# Patient Record
Sex: FEMALE | Race: OTHER | NOT HISPANIC OR LATINO
[De-identification: names, ages, dates, MRNs, and addresses within clinical notes are randomized per-mention and may not be internally consistent; named-entity substitution may affect disease eponyms.]

---

## 2019-09-03 ENCOUNTER — RECORD ABSTRACTING (OUTPATIENT)
Age: 54
End: 2019-09-03

## 2019-09-04 ENCOUNTER — APPOINTMENT (OUTPATIENT)
Dept: ELECTROPHYSIOLOGY | Facility: CLINIC | Age: 54
End: 2019-09-04
Payer: COMMERCIAL

## 2019-09-04 VITALS
DIASTOLIC BLOOD PRESSURE: 78 MMHG | OXYGEN SATURATION: 96 % | HEART RATE: 83 BPM | HEIGHT: 65 IN | BODY MASS INDEX: 27.99 KG/M2 | WEIGHT: 168 LBS | SYSTOLIC BLOOD PRESSURE: 138 MMHG

## 2019-09-04 DIAGNOSIS — Z86.79 PERSONAL HISTORY OF OTHER DISEASES OF THE CIRCULATORY SYSTEM: ICD-10-CM

## 2019-09-04 DIAGNOSIS — Z87.09 PERSONAL HISTORY OF OTHER DISEASES OF THE RESPIRATORY SYSTEM: ICD-10-CM

## 2019-09-04 DIAGNOSIS — Z85.3 PERSONAL HISTORY OF MALIGNANT NEOPLASM OF BREAST: ICD-10-CM

## 2019-09-04 DIAGNOSIS — I47.1 SUPRAVENTRICULAR TACHYCARDIA: ICD-10-CM

## 2019-09-04 DIAGNOSIS — Z87.891 PERSONAL HISTORY OF NICOTINE DEPENDENCE: ICD-10-CM

## 2019-09-04 PROCEDURE — 93000 ELECTROCARDIOGRAM COMPLETE: CPT

## 2019-09-04 PROCEDURE — 99244 OFF/OP CNSLTJ NEW/EST MOD 40: CPT

## 2019-09-04 RX ORDER — HYDROCHLOROTHIAZIDE 25 MG/1
25 TABLET ORAL DAILY
Qty: 90 | Refills: 3 | Status: ACTIVE | COMMUNITY

## 2019-09-04 NOTE — DISCUSSION/SUMMARY
[FreeTextEntry1] : 53 year old woman with history of asthma, breast cancer s/p reconstruction, HTN, obesity s/p bariatric surgery, presenting for evaluation of palpitation and suspected SVT. She has a very long history of palpitation, with both mild “fluttering” and more recent sustained tachycardia which required adenosine x 3 for termination after calling EMS. I do suspect she has an underlying SVT and possible accessory pathway mediated tachycardia (based on long history and different symptoms) but there is no clear preexcitation on ECG. We discussed management options in detail, including medical therapy and catheter ablation. The risks and benefits of ablation, including procedure related risks such as bleeding, cardiac perforation, tamponade, stroke, and heart block requiring pacemaker implant were discussed. She expressed understanding, and does ultimately want to proceed with SVT ablation when she is ready. \par -will get 1 week event monitor to evaluate for symptom correlation. \par -EP study and SVT ablation. Hold Toprol for 5 days prior to the procedure\par -will consider repeat monitoring after ablation pending her symptoms given her different types of palpitation to evaluate for other arrhythmias eg AF or ectopy.\par

## 2019-09-04 NOTE — REASON FOR VISIT
[Consultation] : a consultation regarding [Palpitations] : palpitations [Supraventricular Tachycardia] : supraventricular tachycardia [FreeTextEntry1] : ref Dr Thompson

## 2019-09-04 NOTE — REVIEW OF SYSTEMS
[Shortness Of Breath] : no shortness of breath [Dyspnea on exertion] : not dyspnea during exertion [Chest Pain] : no chest pain [Lower Ext Edema] : no extremity edema [Dizziness] : no dizziness [Palpitations] : palpitations [Convulsions] : no convulsions [Confusion] : no confusion was observed [Easy Bleeding] : no tendency for easy bleeding [Easy Bruising] : no tendency for easy bruising [Negative] : Genitourinary

## 2019-09-04 NOTE — HISTORY OF PRESENT ILLNESS
[FreeTextEntry1] : 53 year old woman with history of asthma, breast cancer s/p reconstruction, HTN, obesity s/p bariatric surgery, presenting for evaluation of palpitation and suspected SVT. \par \par She has had palpitation since she was in high school, describing episodes of chest fluttering lasting minutes occurring suddenly, and often resolving with cough or bearing down. Workup with stress test and echo were normal and she has subsequently undergone successful bariatric surgery. A Holter monitor in 2015 revealed sinus rhythm with rates  bpm, and one run of brief irregular AT for 4 beats at 131 bpm, but no symptoms occurred during monitoring. She had an episode of more sustained palpitation with associated presyncope in 7/19 while at work and was noted to have SVT >185 bpm which terminated after adenosine administration (3 doses) during ambulance transfer to a [facility]. She was subsequently in normal rhythm and felt better. She has noted that this episode was different that her usual “Fluttering”.\par She is currently on Toprol 100mg qd which was increased in July 2019. She has not noted any significant improvement in her palpitation with this medication. She denies syncope. She is now referred for consideration of SVT ablation. \par ECG now reveals sinus rhythm with normal intervals and no manifest preexcitaiton. \par

## 2019-09-04 NOTE — PHYSICAL EXAM
[General Appearance - Well Developed] : well developed [Well Groomed] : well groomed [General Appearance - In No Acute Distress] : no acute distress [Normal Conjunctiva] : the conjunctiva exhibited no abnormalities [General Appearance - Well Nourished] : well nourished [Normal Jugular Venous V Waves Present] : normal jugular venous V waves present [Normal Oral Mucosa] : normal oral mucosa [Heart Rate And Rhythm] : heart rate and rhythm were normal [Heart Sounds] : normal S1 and S2 [Murmurs] : no murmurs present [Edema] : no peripheral edema present [Respiration, Rhythm And Depth] : normal respiratory rhythm and effort [Auscultation Breath Sounds / Voice Sounds] : lungs were clear to auscultation bilaterally [Abdomen Tenderness] : non-tender [Abdomen Soft] : soft [Abnormal Walk] : normal gait [Cyanosis, Localized] : no localized cyanosis [Nail Clubbing] : no clubbing of the fingernails [Oriented To Time, Place, And Person] : oriented to person, place, and time [] : no rash [Impaired Insight] : insight and judgment were intact [No Anxiety] : not feeling anxious

## 2020-08-13 ENCOUNTER — TRANSCRIPTION ENCOUNTER (OUTPATIENT)
Age: 55
End: 2020-08-13

## 2021-01-31 ENCOUNTER — TRANSCRIPTION ENCOUNTER (OUTPATIENT)
Age: 56
End: 2021-01-31

## 2021-02-26 ENCOUNTER — TRANSCRIPTION ENCOUNTER (OUTPATIENT)
Age: 56
End: 2021-02-26

## 2021-03-29 ENCOUNTER — TRANSCRIPTION ENCOUNTER (OUTPATIENT)
Age: 56
End: 2021-03-29

## 2021-04-21 ENCOUNTER — TRANSCRIPTION ENCOUNTER (OUTPATIENT)
Age: 56
End: 2021-04-21

## 2021-10-26 DIAGNOSIS — Z86.79 PERSONAL HISTORY OF OTHER DISEASES OF THE CIRCULATORY SYSTEM: ICD-10-CM

## 2021-10-26 DIAGNOSIS — I48.91 UNSPECIFIED ATRIAL FIBRILLATION: ICD-10-CM

## 2021-10-27 ENCOUNTER — APPOINTMENT (OUTPATIENT)
Dept: ELECTROPHYSIOLOGY | Facility: CLINIC | Age: 56
End: 2021-10-27
Payer: COMMERCIAL

## 2021-10-27 VITALS
HEIGHT: 65 IN | DIASTOLIC BLOOD PRESSURE: 84 MMHG | SYSTOLIC BLOOD PRESSURE: 136 MMHG | HEART RATE: 69 BPM | WEIGHT: 176 LBS | BODY MASS INDEX: 29.32 KG/M2

## 2021-10-27 PROCEDURE — 99215 OFFICE O/P EST HI 40 MIN: CPT

## 2021-10-27 PROCEDURE — 93000 ELECTROCARDIOGRAM COMPLETE: CPT

## 2021-10-27 NOTE — PHYSICAL EXAM
[Well Developed] : well developed [Well Nourished] : well nourished [No Acute Distress] : no acute distress [Normal Conjunctiva] : normal conjunctiva [Normal Venous Pressure] : normal venous pressure [No Carotid Bruit] : no carotid bruit [Normal S1, S2] : normal S1, S2 [No Murmur] : no murmur [No Rub] : no rub [No Gallop] : no gallop [Clear Lung Fields] : clear lung fields [Good Air Entry] : good air entry [No Respiratory Distress] : no respiratory distress  [Soft] : abdomen soft [Normal Gait] : normal gait [No Edema] : no edema [No Cyanosis] : no cyanosis [No Clubbing] : no clubbing [No Varicosities] : no varicosities [No Rash] : no rash [No Skin Lesions] : no skin lesions [Moves all extremities] : moves all extremities [No Focal Deficits] : no focal deficits [Normal Speech] : normal speech [Alert and Oriented] : alert and oriented [Normal memory] : normal memory

## 2021-10-27 NOTE — REVIEW OF SYSTEMS
[SOB] : no shortness of breath [Chest Discomfort] : chest discomfort [Leg Claudication] : no intermittent leg claudication [Palpitations] : palpitations [Syncope] : no syncope [Negative] : Heme/Lymph

## 2021-10-27 NOTE — DISCUSSION/SUMMARY
[FreeTextEntry1] : 55 year old woman with history of asthma, breast cancer s/p reconstruction, HTN, obesity s/p bariatric surgery, presenting for follow-up of palpitation and suspected SVT and recently diagnosed atrial fibrillation. She has a long history of SVT which had previously been terminated with adenosine and vagal maneuvers. Recently she had severe palpitation and was found to have AF and atrial flutter requiring hospital admission. The arrhythmias have recurred despite beta blockade, and recently the dose of beta blocker was lowered potentially due to low BP.  \par \par We did discuss her arrhythmias in detail; I explained that SVT and AF are distinct and often unrelated arrhythmias, but that often SVT may be a trigger for AF, and treatment of SVT (such as with successful ablation) may prevent further AF in many cases. We did again discuss treatment options including medical therapy and ablation. The risks and benefits of SVT ablation were reviewed, including procedure-related risks such as bleeding, vascular injury, cardiac perforation, stroke and heart block requiring ppm implant. I explained that following SVT ablation would monitor her for recurrent AF (or other arrhythmia) and determine need for further treatment for this based on findings. She expressed understanding, and does want to proceed with SVT ablation.  \par \par -Ablation of SVT. She will plan to do this following a planned shoulder/rotator cuff surgery which is planned in December. Hold Toprol for 5 days prior.  \par \par -continue current meds including Toprol and Xarelto for now. Will plan surveillance monitoring for recurrent arrhythmia following SVT ablation, and reassess need for anticoagulation following this. We did discuss potential for ILR implant for close monitoring to clarify presence of AF, but she reports her PMD advised her not to have an ILR.

## 2021-10-27 NOTE — CARDIOLOGY SUMMARY
[de-identified] : 10/27/21 sinus rhythm 69 bpm, narrow QRS, nml intervals [de-identified] : ETT 9/17/21 negative for ischemia or induced arrhythmia  [___] : [unfilled]

## 2021-10-27 NOTE — HISTORY OF PRESENT ILLNESS
[FreeTextEntry1] : 55 year old woman with history of asthma, breast cancer s/p reconstruction, HTN, obesity s/p bariatric surgery, presenting for follow-up of palpitation and suspected SVT and recently diagnosed atrial fibrillation.  \par \par She has a long history of palpitation which began in high school, with sudden episodes of “fluttering” in chest that occur suddenly, and often resolve with cough or bearing down. In 7/2019 she had an episode of more sustained palpitation while at work and was found to have SVT at >185 bpm- EMS was called and she was found to be in SVT which terminated with adenosine administration. She had subsequently been on Toprol 100mg qd, but did not note significant improvement in her palpitation with this. At last visit we did discuss potential for EP study and ablation, but due to COVID this was postponed/cancelled.  \par \par An event monitor was performed 10/17- 10/24/21 which revealed sinus rhythm with average HR 76 bpm (range  bpm) with occasional ectopy but no arrhythmias.  \par \par Recently she has had increased frequent of palpitation, and in 9/2021 she presented to [facility] with severe palpitation and chest pain, and at that time was noted to be in atrial fibrillation with rapid rates. She was apparently given adenosine with no effect, and ultimately given IV cardizem and the arrhythmia terminated. Atrial flutter with 2:1 conduction was also documented in the hospital. She was given anticoagulation with Xarelto, and her Toprol was lowered to 25mg qd (apparently due to low BP).  \par \par An echo with her PMD revealed cardiomegaly but preserved LV function (no records of this).  An ETT after the hospitalized revealed no ischemic changes.  \par \par Since hospital discharge she had another episode of palpitation a few weeks ago, which ultimately terminated with vagal maneuvers.  \par \par She otherwise feels well apart from her palpitation/cheat pain. She does now want to proceed with SVT ablation.

## 2021-11-30 ENCOUNTER — TRANSCRIPTION ENCOUNTER (OUTPATIENT)
Age: 56
End: 2021-11-30

## 2021-12-10 ENCOUNTER — NON-APPOINTMENT (OUTPATIENT)
Age: 56
End: 2021-12-10

## 2022-01-11 ENCOUNTER — OUTPATIENT (OUTPATIENT)
Dept: OUTPATIENT SERVICES | Facility: HOSPITAL | Age: 57
LOS: 1 days | End: 2022-01-11
Payer: COMMERCIAL

## 2022-01-11 VITALS
OXYGEN SATURATION: 98 % | TEMPERATURE: 98 F | WEIGHT: 170.42 LBS | HEIGHT: 64 IN | HEART RATE: 20 BPM | DIASTOLIC BLOOD PRESSURE: 60 MMHG | SYSTOLIC BLOOD PRESSURE: 100 MMHG | RESPIRATION RATE: 20 BRPM

## 2022-01-11 DIAGNOSIS — Z90.3 ACQUIRED ABSENCE OF STOMACH [PART OF]: Chronic | ICD-10-CM

## 2022-01-11 DIAGNOSIS — Z98.890 OTHER SPECIFIED POSTPROCEDURAL STATES: Chronic | ICD-10-CM

## 2022-01-11 DIAGNOSIS — I48.91 UNSPECIFIED ATRIAL FIBRILLATION: ICD-10-CM

## 2022-01-11 DIAGNOSIS — Z90.710 ACQUIRED ABSENCE OF BOTH CERVIX AND UTERUS: Chronic | ICD-10-CM

## 2022-01-11 DIAGNOSIS — G47.33 OBSTRUCTIVE SLEEP APNEA (ADULT) (PEDIATRIC): ICD-10-CM

## 2022-01-11 DIAGNOSIS — Z98.89 OTHER SPECIFIED POSTPROCEDURAL STATES: Chronic | ICD-10-CM

## 2022-01-11 DIAGNOSIS — Z01.818 ENCOUNTER FOR OTHER PREPROCEDURAL EXAMINATION: ICD-10-CM

## 2022-01-11 DIAGNOSIS — Z29.9 ENCOUNTER FOR PROPHYLACTIC MEASURES, UNSPECIFIED: ICD-10-CM

## 2022-01-11 DIAGNOSIS — Z98.51 TUBAL LIGATION STATUS: Chronic | ICD-10-CM

## 2022-01-11 DIAGNOSIS — Z98.84 BARIATRIC SURGERY STATUS: Chronic | ICD-10-CM

## 2022-01-11 DIAGNOSIS — Z90.49 ACQUIRED ABSENCE OF OTHER SPECIFIED PARTS OF DIGESTIVE TRACT: Chronic | ICD-10-CM

## 2022-01-11 LAB
ANION GAP SERPL CALC-SCNC: 10 MMOL/L — SIGNIFICANT CHANGE UP (ref 5–17)
ANISOCYTOSIS BLD QL: SLIGHT — SIGNIFICANT CHANGE UP
APTT BLD: 53.8 SEC — HIGH (ref 27.5–35.5)
BASOPHILS # BLD AUTO: 0.15 K/UL — SIGNIFICANT CHANGE UP (ref 0–0.2)
BASOPHILS NFR BLD AUTO: 4.4 % — HIGH (ref 0–2)
BLD GP AB SCN SERPL QL: SIGNIFICANT CHANGE UP
BUN SERPL-MCNC: 14.4 MG/DL — SIGNIFICANT CHANGE UP (ref 8–20)
CALCIUM SERPL-MCNC: 9.6 MG/DL — SIGNIFICANT CHANGE UP (ref 8.6–10.2)
CHLORIDE SERPL-SCNC: 101 MMOL/L — SIGNIFICANT CHANGE UP (ref 98–107)
CO2 SERPL-SCNC: 29 MMOL/L — SIGNIFICANT CHANGE UP (ref 22–29)
CREAT SERPL-MCNC: 0.96 MG/DL — SIGNIFICANT CHANGE UP (ref 0.5–1.3)
EOSINOPHIL # BLD AUTO: 0.06 K/UL — SIGNIFICANT CHANGE UP (ref 0–0.5)
EOSINOPHIL NFR BLD AUTO: 1.7 % — SIGNIFICANT CHANGE UP (ref 0–6)
GIANT PLATELETS BLD QL SMEAR: PRESENT — SIGNIFICANT CHANGE UP
GLUCOSE SERPL-MCNC: 89 MG/DL — SIGNIFICANT CHANGE UP (ref 70–99)
HCT VFR BLD CALC: 38.8 % — SIGNIFICANT CHANGE UP (ref 34.5–45)
HGB BLD-MCNC: 11.9 G/DL — SIGNIFICANT CHANGE UP (ref 11.5–15.5)
INR BLD: 2.48 RATIO — HIGH (ref 0.88–1.16)
LYMPHOCYTES # BLD AUTO: 1.5 K/UL — SIGNIFICANT CHANGE UP (ref 1–3.3)
LYMPHOCYTES # BLD AUTO: 43.9 % — SIGNIFICANT CHANGE UP (ref 13–44)
MAGNESIUM SERPL-MCNC: 1.9 MG/DL — SIGNIFICANT CHANGE UP (ref 1.8–2.6)
MANUAL SMEAR VERIFICATION: SIGNIFICANT CHANGE UP
MCHC RBC-ENTMCNC: 26.4 PG — LOW (ref 27–34)
MCHC RBC-ENTMCNC: 30.7 GM/DL — LOW (ref 32–36)
MCV RBC AUTO: 86.2 FL — SIGNIFICANT CHANGE UP (ref 80–100)
MICROCYTES BLD QL: SLIGHT — SIGNIFICANT CHANGE UP
MONOCYTES # BLD AUTO: 0.18 K/UL — SIGNIFICANT CHANGE UP (ref 0–0.9)
MONOCYTES NFR BLD AUTO: 5.3 % — SIGNIFICANT CHANGE UP (ref 2–14)
NEUTROPHILS # BLD AUTO: 1.44 K/UL — LOW (ref 1.8–7.4)
NEUTROPHILS NFR BLD AUTO: 42.1 % — LOW (ref 43–77)
OVALOCYTES BLD QL SMEAR: SLIGHT — SIGNIFICANT CHANGE UP
PLAT MORPH BLD: NORMAL — SIGNIFICANT CHANGE UP
PLATELET # BLD AUTO: 293 K/UL — SIGNIFICANT CHANGE UP (ref 150–400)
POIKILOCYTOSIS BLD QL AUTO: SLIGHT — SIGNIFICANT CHANGE UP
POLYCHROMASIA BLD QL SMEAR: SLIGHT — SIGNIFICANT CHANGE UP
POTASSIUM SERPL-MCNC: 3.8 MMOL/L — SIGNIFICANT CHANGE UP (ref 3.5–5.3)
POTASSIUM SERPL-SCNC: 3.8 MMOL/L — SIGNIFICANT CHANGE UP (ref 3.5–5.3)
PROTHROM AB SERPL-ACNC: 27.6 SEC — HIGH (ref 10.6–13.6)
RBC # BLD: 4.5 M/UL — SIGNIFICANT CHANGE UP (ref 3.8–5.2)
RBC # FLD: 15.6 % — HIGH (ref 10.3–14.5)
RBC BLD AUTO: ABNORMAL
SODIUM SERPL-SCNC: 140 MMOL/L — SIGNIFICANT CHANGE UP (ref 135–145)
VARIANT LYMPHS # BLD: 2.6 % — SIGNIFICANT CHANGE UP (ref 0–6)
WBC # BLD: 3.42 K/UL — LOW (ref 3.8–10.5)
WBC # FLD AUTO: 3.42 K/UL — LOW (ref 3.8–10.5)

## 2022-01-11 PROCEDURE — 93010 ELECTROCARDIOGRAM REPORT: CPT

## 2022-01-11 PROCEDURE — 93005 ELECTROCARDIOGRAM TRACING: CPT

## 2022-01-11 PROCEDURE — G0463: CPT

## 2022-01-11 NOTE — ASU PATIENT PROFILE, ADULT - FALL HARM RISK - UNIVERSAL INTERVENTIONS
Bed in lowest position, wheels locked, appropriate side rails in place/Call bell, personal items and telephone in reach/Instruct patient to call for assistance before getting out of bed or chair/Non-slip footwear when patient is out of bed/[place] to call system/Physically safe environment - no spills, clutter or unnecessary equipment/Purposeful Proactive Rounding/Room/bathroom lighting operational, light cord in reach

## 2022-01-11 NOTE — H&P PST ADULT - LAB RESULTS AND INTERPRETATION
Labs pending Labs pending  1/11/22 18:20 All available labs noted as documented. All abnormal labs noted as documented and have been faxed to PCP Dr. Mast. T&S pending. Sj MS, FNP-BC

## 2022-01-11 NOTE — H&P PST ADULT - HISTORY OF PRESENT ILLNESS
55 y/o female presents today to PST pending SVT ablation 1/18/22 Dr. Michael Gonzalez secondary to afib. Pt. with history of obesity, IRMA, HTN, SVT, anemia, right breast cancer s/p radiation tr eatment and lumpectomy. Pt. states she presently has a kidney stone that she is pending an xray for with Urology. Pt. states she has had a longstanding history of palpitations. Pt. states she was told she needed an ablation in 2020 but she did not have this due to the COVID 19 pandemic. As per chart pt. had previously received adenosine and vagal maneuvers for SVT which were successful in terminating the arrythmia. Pt. states 9/2021 she was told she had afib and started on Xarelto at that time. Pt. states she had been on metoprolol prior to this.   57 y/o female presents today to [facility] pending SVT ablation 1/18/22 Dr. Michael Gonzalez secondary to afib. Pt. with history of obesity s/p bariatric surgery, IRMA not on CPAP, HTN, SVT, anemia, right breast cancer s/p radiation treatment and lumpectomy. Pt. states she presently has a kidney stone that she is pending an xray for with Urology. Pt. states she has had a longstanding history of palpitations. Pt. states she was told she needed an ablation in 2020 but she did not have this due to the COVID 19 pandemic. As per chart pt. had previously received adenosine and vagal maneuvers for SVT which were successful in terminating the arrythmia at that time. Pt. states 9/2021 she was told she had afib and started on Xarelto at that time. Pt. states she had been on metoprolol prior to this.      As per chart:  "ECG: 10/27/21 sinus rhythm 69 bpm, narrow QRS, nml intervals   Stress Test: ETT 9/17/21 negative for ischemia or induced arrhythmia "

## 2022-01-11 NOTE — H&P PST ADULT - NSICDXPASTSURGICALHX_GEN_ALL_CORE_FT
PAST SURGICAL HISTORY:  H/O gastric sleeve 5/2018    LAP-BAND surgery status 05/2005    S/P breast reconstruction, bilateral with bilateral breast reduction    S/P cholecystectomy 12/1998    S/P hernia repair 2/2017    S/P hysterectomy 2014    S/P lumpectomy, right breast 11/2006 ( Radiation x 6 weeks )    S/P tubal ligation 1998

## 2022-01-11 NOTE — H&P PST ADULT - NEGATIVE MUSCULOSKELETAL SYMPTOMS
no arthralgia/no arthritis/no joint swelling/no myalgia/no muscle cramps/no muscle weakness/no stiffness/no neck pain/no arm pain L/no arm pain R

## 2022-01-11 NOTE — H&P PST ADULT - PROBLEM SELECTOR PLAN 2
+IRMA, does not presently wear CPAP. Medical follow up and management as indicated. Surgical team to follow.

## 2022-01-11 NOTE — H&P PST ADULT - ASSESSMENT
55 y/o female presents today to [facility] pending SVT ablation 22 Dr. Michael Gonzalez secondary to afib. Pt. with history of obesity s/p bariatric surgery, IRMA not on CPAP, HTN, SVT, anemia, right breast cancer s/p radiation treatment and lumpectomy. Pt. states she presently has a kidney stone that she is pending an xray for with Urology. Pt. states she has had a longstanding history of palpitations. Pt. states she was told she needed an ablation in  but she did not have this due to the COVID 19 pandemic. As per chart pt. had previously received adenosine and vagal maneuvers for SVT which were successful in terminating the arrythmia at that time. Pt. states 2021 she was told she had afib and started on Xarelto at that time. Pt. states she had been on metoprolol prior to this.      Patient educated on surgical scrub, COVID testing, preadmission instructions, and day of procedure medications as per policy, pt. verbalizes understanding and agreement.  Pt instructed to stop vitamins/supplements/herbal medication/NSAIDS for one week prior to surgery and pt. verbalizes understanding and agreement.  Pt. educated via both verbal and written means of communication regarding all preoperative education and instruction as per policy and pt. verbalized agreement and understanding.  As per EP protocol, pt. advised to hold xarelto the AM of the procedure only, pt. verbalized agreement and understanding.     CAPRINI SCORE    AGE RELATED RISK FACTORS                                                             [x ] Age 41-60 years                                            (1 Point)  [ ] Age: 61-74 years                                           (2 Points)                 [ ] Age= 75 years                                                (3 Points)             DISEASE RELATED RISK FACTORS                                                       [ ] Edema in the lower extremities                 (1 Point)                     [ ] Varicose veins                                               (1 Point)                                 [ x] BMI > 25 Kg/m2                                            (1 Point)                                  [ ] Serious infection (ie PNA)                            (1 Point)                     [ ] Lung disease ( COPD, Emphysema)            (1 Point)                                                                          [ ] Acute myocardial infarction                         (1 Point)                  [ ] Congestive heart failure (in the previous month)  (1 Point)         [ ] Inflammatory bowel disease                            (1 Point)                  [ ] Central venous access, PICC or Port               (2 points)       (within the last month)                                                                [ ] Stroke (in the previous month)                        (5 Points)    [ ] Previous or present malignancy                       (2 points)                                                                                                                                                         HEMATOLOGY RELATED FACTORS                                                         [ ] Prior episodes of VTE                                     (3 Points)                     [ ] Positive family history for VTE                      (3 Points)                  [ ] Prothrombin 90508 A                                     (3 Points)                     [ ] Factor V Leiden                                                (3 Points)                        [ ] Lupus anticoagulants                                      (3 Points)                                                           [ ] Anticardiolipin antibodies                              (3 Points)                                                       [ ] High homocysteine in the blood                   (3 Points)                                             [ ] Other congenital or acquired thrombophilia      (3 Points)                                                [ ] Heparin induced thrombocytopenia                  (3 Points)                                        MOBILITY RELATED FACTORS  [ ] Bed rest                                                         (1 Point)  [ ] Plaster cast                                                    (2 points)  [ ] Bed bound for more than 72 hours           (2 Points)    GENDER SPECIFIC FACTORS  [ ] Pregnancy or had a baby within the last month   (1 Point)  [ ] Post-partum < 6 weeks                                   (1 Point)  [ ] Hormonal therapy  or oral contraception   (1 Point)  [ ] History of pregnancy complications              (1 point)  [ ] Unexplained or recurrent              (1 Point)    OTHER RISK FACTORS                                           (1 Point)  [ ] BMI >40, smoking, diabetes requiring insulin, chemotherapy  blood transfusions and length of surgery over 2 hours    SURGERY RELATED RISK FACTORS  [ ]  Section within the last month     (1 Point)  [ ] Minor surgery                                                  (1 Point)  [ ] Arthroscopic surgery                                       (2 Points)  [x ] Planned major surgery lasting more            (2 Points)      than 45 minutes     [ ] Elective hip or knee joint replacement       (5 points)       surgery                                                TRAUMA RELATED RISK FACTORS  [ ] Fracture of the hip, pelvis, or leg                       (5 Points)  [ ] Spinal cord injury resulting in paralysis             (5 points)       (in the previous month)    [ ] Paralysis  (less than 1 month)                             (5 Points)  [ ] Multiple Trauma within 1 month                        (5 Points)    Total Score [     4   ]    Caprini Score 0-2: Low Risk, NO VTE prophylaxis required for most patients, encourage ambulation  Caprini Score 3-6: Moderate Risk , pharmacologic VTE prophylaxis is indicated for most patients (in the absence of contraindications)  Caprini Score Greater than or =7: High risk, pharmocologic VTE prophylaxis indicated for most patients (in the absence of contraindications)                          OPIOID RISK TOOL    LUBNA EACH BOX THAT APPLIES AND ADD TOTALS AT THE END    FAMILY HISTORY OF SUBSTANCE ABUSE                 FEMALE         MALE                                                Alcohol                             [  ]1 pt          [  ]3pts                                               Illegal Durgs                     [  ]2 pts        [  ]3pts                                               Rx Drugs                           [  ]4 pts        [  ]4 pts    PERSONAL HISTORY OF SUBSTANCE ABUSE                                                                                          Alcohol                             [  ]3 pts       [  ]3 pts                                               Illegal Drugs                     [  ]4 pts        [  ]4 pts                                               Rx Drugs                           [  ]5 pts        [  ]5 pts    AGE BETWEEN 16-45 YEARS                                      [  ]1 pt         [  ]1 pt    HISTORY OF PREADOLESCENT   SEXUAL ABUSE                                                             [  ]3 pts        [  ]0pts    PSYCHOLOGICAL DISEASE                     ADD, OCD, Bipolar, Schizophrenia        [  ]2 pts         [  ]2 pts                      Depression                                               [  ]1 pt           [  ]1 pt           SCORING TOTAL   (add numbers and type here)              (0)                                     A score of 3 or lower indicated LOW risk for future opioid abuse  A score of 4 to 7 indicated moderate risk for future opioid abuse  A score of 8 or higher indicates a high risk for opioid abuse
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
## 2022-01-11 NOTE — H&P PST ADULT - NSICDXFAMILYHX_GEN_ALL_CORE_FT
FAMILY HISTORY:  Father  Still living? Unknown  FH: hypertension, Age at diagnosis: Age Unknown    Mother  Still living? Unknown  FH: hypertension, Age at diagnosis: Age Unknown  FH: type 2 diabetes, Age at diagnosis: Age Unknown    Grandparent  Still living? Unknown  FH: type 2 diabetes, Age at diagnosis: Age Unknown    Aunt  Still living? Unknown  FH: type 2 diabetes, Age at diagnosis: Age Unknown

## 2022-01-11 NOTE — H&P PST ADULT - NEUROLOGICAL DETAILS
alert and oriented x 3/responds to pain/responds to verbal commands/sensation intact/cranial nerves intact/no spontaneous movement/superficial reflexes intact/normal strength

## 2022-01-18 ENCOUNTER — TRANSCRIPTION ENCOUNTER (OUTPATIENT)
Age: 57
End: 2022-01-18

## 2022-01-18 ENCOUNTER — OUTPATIENT (OUTPATIENT)
Dept: OUTPATIENT SERVICES | Facility: HOSPITAL | Age: 57
LOS: 1 days | End: 2022-01-18
Payer: COMMERCIAL

## 2022-01-18 VITALS
SYSTOLIC BLOOD PRESSURE: 100 MMHG | OXYGEN SATURATION: 98 % | DIASTOLIC BLOOD PRESSURE: 64 MMHG | RESPIRATION RATE: 15 BRPM | HEART RATE: 73 BPM

## 2022-01-18 VITALS
SYSTOLIC BLOOD PRESSURE: 110 MMHG | HEART RATE: 68 BPM | OXYGEN SATURATION: 98 % | TEMPERATURE: 98 F | RESPIRATION RATE: 15 BRPM | DIASTOLIC BLOOD PRESSURE: 73 MMHG

## 2022-01-18 DIAGNOSIS — Z98.84 BARIATRIC SURGERY STATUS: Chronic | ICD-10-CM

## 2022-01-18 DIAGNOSIS — Z90.49 ACQUIRED ABSENCE OF OTHER SPECIFIED PARTS OF DIGESTIVE TRACT: Chronic | ICD-10-CM

## 2022-01-18 DIAGNOSIS — I48.91 UNSPECIFIED ATRIAL FIBRILLATION: ICD-10-CM

## 2022-01-18 DIAGNOSIS — Z98.89 OTHER SPECIFIED POSTPROCEDURAL STATES: Chronic | ICD-10-CM

## 2022-01-18 DIAGNOSIS — Z98.51 TUBAL LIGATION STATUS: Chronic | ICD-10-CM

## 2022-01-18 DIAGNOSIS — Z90.710 ACQUIRED ABSENCE OF BOTH CERVIX AND UTERUS: Chronic | ICD-10-CM

## 2022-01-18 DIAGNOSIS — Z90.3 ACQUIRED ABSENCE OF STOMACH [PART OF]: Chronic | ICD-10-CM

## 2022-01-18 DIAGNOSIS — Z98.890 OTHER SPECIFIED POSTPROCEDURAL STATES: Chronic | ICD-10-CM

## 2022-01-18 PROCEDURE — 93662 INTRACARDIAC ECG (ICE): CPT

## 2022-01-18 PROCEDURE — 93662 INTRACARDIAC ECG (ICE): CPT | Mod: 26

## 2022-01-18 PROCEDURE — 36415 COLL VENOUS BLD VENIPUNCTURE: CPT

## 2022-01-18 PROCEDURE — C1760: CPT

## 2022-01-18 PROCEDURE — C1766: CPT

## 2022-01-18 PROCEDURE — C1731: CPT

## 2022-01-18 PROCEDURE — 93653 COMPRE EP EVAL TX SVT: CPT

## 2022-01-18 PROCEDURE — 93010 ELECTROCARDIOGRAM REPORT: CPT

## 2022-01-18 PROCEDURE — C1730: CPT

## 2022-01-18 PROCEDURE — 93005 ELECTROCARDIOGRAM TRACING: CPT

## 2022-01-18 PROCEDURE — C1732: CPT

## 2022-01-18 RX ORDER — MAGNESIUM SULFATE 500 MG/ML
2 VIAL (ML) INJECTION ONCE
Refills: 0 | Status: COMPLETED | OUTPATIENT
Start: 2022-01-18 | End: 2022-01-18

## 2022-01-18 RX ORDER — ONDANSETRON 8 MG/1
4 TABLET, FILM COATED ORAL EVERY 8 HOURS
Refills: 0 | Status: DISCONTINUED | OUTPATIENT
Start: 2022-01-18 | End: 2022-02-01

## 2022-01-18 RX ORDER — ACETAMINOPHEN 500 MG
650 TABLET ORAL EVERY 6 HOURS
Refills: 0 | Status: DISCONTINUED | OUTPATIENT
Start: 2022-01-18 | End: 2022-02-01

## 2022-01-18 RX ORDER — ALPRAZOLAM 0.25 MG
0.25 TABLET ORAL EVERY 6 HOURS
Refills: 0 | Status: DISCONTINUED | OUTPATIENT
Start: 2022-01-18 | End: 2022-01-18

## 2022-01-18 RX ORDER — RIVAROXABAN 15 MG-20MG
20 KIT ORAL DAILY
Refills: 0 | Status: DISCONTINUED | OUTPATIENT
Start: 2022-01-18 | End: 2022-01-18

## 2022-01-18 RX ORDER — ONDANSETRON 8 MG/1
4 TABLET, FILM COATED ORAL EVERY 8 HOURS
Refills: 0 | Status: DISCONTINUED | OUTPATIENT
Start: 2022-01-18 | End: 2022-01-18

## 2022-01-18 RX ORDER — METOPROLOL TARTRATE 50 MG
25 TABLET ORAL DAILY
Refills: 0 | Status: DISCONTINUED | OUTPATIENT
Start: 2022-01-18 | End: 2022-02-01

## 2022-01-18 RX ORDER — RIVAROXABAN 15 MG-20MG
20 KIT ORAL DAILY
Refills: 0 | Status: DISCONTINUED | OUTPATIENT
Start: 2022-01-18 | End: 2022-02-01

## 2022-01-18 RX ORDER — HYDROCHLOROTHIAZIDE 25 MG
25 TABLET ORAL DAILY
Refills: 0 | Status: DISCONTINUED | OUTPATIENT
Start: 2022-01-18 | End: 2022-02-01

## 2022-01-18 RX ORDER — BENZOCAINE AND MENTHOL 5; 1 G/100ML; G/100ML
1 LIQUID ORAL
Refills: 0 | Status: DISCONTINUED | OUTPATIENT
Start: 2022-01-18 | End: 2022-02-01

## 2022-01-18 RX ORDER — ACETAMINOPHEN 500 MG
1000 TABLET ORAL ONCE
Refills: 0 | Status: COMPLETED | OUTPATIENT
Start: 2022-01-18 | End: 2022-01-18

## 2022-01-18 RX ADMIN — Medication 25 GRAM(S): at 16:02

## 2022-01-18 RX ADMIN — RIVAROXABAN 20 MILLIGRAM(S): KIT at 18:40

## 2022-01-18 RX ADMIN — Medication 400 MILLIGRAM(S): at 14:48

## 2022-01-18 RX ADMIN — Medication 1000 MILLIGRAM(S): at 14:49

## 2022-01-18 NOTE — DISCHARGE NOTE NURSING/CASE MANAGEMENT/SOCIAL WORK - PATIENT PORTAL LINK FT
You can access the FollowMyHealth Patient Portal offered by [facility] by registering at the following website: [email or web address]. By joining [facility]’s FollowMyHealth portal, you will also be able to view your health information using other applications (apps) compatible with our system.

## 2022-01-18 NOTE — DISCHARGE NOTE PROVIDER - NSDCMRMEDTOKEN_GEN_ALL_CORE_FT
CoQ10: orally once a day  Echinacea oral tablet: orally once a day  hydroCHLOROthiazide 25 mg oral tablet: 1 tab(s) orally once a day  metoprolol succinate 25 mg oral capsule, extended release: 1 cap(s) orally once a day  Vitamin B12: orally once a day  Vitamin D3: orally once a day  Xarelto 20 mg oral tablet: orally once a day

## 2022-01-18 NOTE — DISCHARGE NOTE PROVIDER - NSDCFUADDAPPT_GEN_ALL_CORE_FT
Our office will contact you in 3-5 days to schedule this appointment. Please call [phone] with questions or concerns.

## 2022-01-18 NOTE — DISCHARGE NOTE PROVIDER - HOSPITAL COURSE
56 year old woman with PMH of asthma, breast cancer s/p reconstruction, HTN, obesity s/p bariatric surgery, SVT and recently diagnosed atrial fibrillation. She has a long history of SVT which had previously been terminated with adenosine and vagal maneuvers. Recently she had severe palpitation and was found to have AF and atrial flutter requiring hospital admission. She presents electively today for and is now status post uncomplicated radiofrequency ablation of AVRNT via b/l FV.

## 2022-01-18 NOTE — DISCHARGE NOTE NURSING/CASE MANAGEMENT/SOCIAL WORK - NSDCPEFALRISK_GEN_ALL_CORE
For information on Fall & Injury Prevention, visit: [email or web address] OR  [email or web address] OR  https://www.cdc.gov/steadi/patient.html

## 2022-01-18 NOTE — PROGRESS NOTE ADULT - SUBJECTIVE AND OBJECTIVE BOX
PROCEDURE(S): Radiofrequency Ablation of Supraventricular Tachycardia    ELECTRPHYSIOLOGIST(S): Michael Gonzalez MD         COMPLICATIONS:  none        DISPOSITION:  observation     CONDITION: stable      Pt doing well s/p AVNRT ablation via b/l FV. Hemostasis achieved with b/l MVP Vascade closure devices. Denies complaint.       MEDICATIONS  (STANDING):  hydrochlorothiazide 25 milliGRAM(s) Oral daily  metoprolol succinate ER 25 milliGRAM(s) Oral daily  rivaroxaban 20 milliGRAM(s) Oral daily    MEDICATIONS  (PRN):  acetaminophen     Tablet .. 650 milliGRAM(s) Oral every 6 hours PRN Mild Pain (1 - 3)      Allergies  morphine (Hives)    Exam:   HR: 68  BP: 110/73   RR: 15   SpO2: 98%     VSS, NAD, Sleepy from anesthesia  Card: S1/S2, RRR, no m/g/r  Resp: lungs CTA b/l  Abd: S/NT/ND  Groins: sites C/D/I; no bleeding, hematoma, erythema, exudate or edema  Ext: no edema; distal pulses intact    I/Os: net + 650    ECG: SR at 69bpm    Assessment:   56 year old woman with PMH of asthma, breast cancer s/p reconstruction, HTN, obesity s/p bariatric surgery, SVT and recently diagnosed atrial fibrillation. She has a long history of SVT which had previously been terminated with adenosine and vagal maneuvers. Recently she had severe palpitation and was found to have AF and atrial flutter requiring hospital admission. She presents electively today for and is now status post uncomplicated radiofrequency ablation of AVRNT via b/l FV.      Plan:   Bedrest x 2 hours, then OOB with assistance and progress as tolerated.   Pending groin status: 20 mg PO Xarelto to resume at 16:00 tonight.   Continue Metoprolol.   Continue other home medications.   Strict I/Os.  Please encourage incentive spirometry and ambulation once able.  Observation and monitoring on telemetry with anticipated discharge later today.  Outpt follow up in 2-4 weeks.  PROCEDURE(S): Radiofrequency Ablation of Supraventricular Tachycardia    ELECTRPHYSIOLOGIST(S): Michael Gonzalez MD         COMPLICATIONS:  none        DISPOSITION:  observation     CONDITION: stable      Pt doing well s/p AVNRT ablation via b/l FV. Hemostasis achieved with b/l MVP Vascade closure devices. Denies complaint.       MEDICATIONS  (STANDING):  hydrochlorothiazide 25 milliGRAM(s) Oral daily  metoprolol succinate ER 25 milliGRAM(s) Oral daily  rivaroxaban 20 milliGRAM(s) Oral daily    MEDICATIONS  (PRN):  acetaminophen     Tablet .. 650 milliGRAM(s) Oral every 6 hours PRN Mild Pain (1 - 3)      Allergies  morphine (Hives)    Exam:   HR: 68  BP: 110/73   RR: 15   SpO2: 98%     VSS, NAD, Sleepy from anesthesia  Card: S1/S2, RRR, no m/g/r  Resp: lungs CTA b/l  Abd: S/NT/ND  Groins: sites C/D/I; no bleeding, hematoma, erythema, exudate or edema  Ext: no edema; distal pulses intact    I/Os: net + 650    ECG: SR at 69bpm    Assessment:   56 year old woman with PMH of asthma, breast cancer s/p reconstruction, HTN, obesity s/p bariatric surgery, SVT and recently diagnosed atrial fibrillation. She has a long history of SVT which had previously been terminated with adenosine and vagal maneuvers. Recently she had severe palpitation and was found to have AF and atrial flutter requiring hospital admission. She presents electively today for and is now status post uncomplicated radiofrequency ablation of AVRNT via b/l FV.      Plan:   Bedrest x 2 hours, then OOB with assistance and progress as tolerated.   Pending groin status: 20 mg PO Xarelto to resume at 16:00 tonight.   Continue Metoprolol.   Continue other home medications.   Strict I/Os.  Please encourage incentive spirometry and ambulation once able.  Observation and monitoring on telemetry with anticipated discharge later today or tomorrow morning.  Outpt follow up in 2-4 weeks.

## 2022-01-18 NOTE — DISCHARGE NOTE PROVIDER - CARE PROVIDER_API CALL
Michael Gonzalez)  Cardiology; Internal Medicine  [address] [facility], [address] [place] [address]  Phone: [phone]  Fax: [phone]  Follow Up Time:

## 2022-01-18 NOTE — PROGRESS NOTE ADULT - SUBJECTIVE AND OBJECTIVE BOX
Pt arrived for scheduled SVT ablation. PST performed on 1/11/2022. Labs reviewed. NPO > 10 hours confirmed. Last Xarelto **    Pt is a 55 year old woman with PMH of asthma, breast cancer s/p reconstruction, HTN, obesity s/p bariatric surgery, SVT and recently diagnosed atrial fibrillation. She has a long history of SVT which had previously been terminated with adenosine and vagal maneuvers. Recently she had severe palpitation and was found to have AF and atrial flutter requiring hospital admission. She presents electively todat for SVT ablation.    Cardiologist: Dr. Thompson    Cardiology summary:  ECG: 10/27/21 sinus rhythm 69 bpm, narrow QRS, nml intervals   Stress Test: ETT 9/17/21 negative for ischemia or induced arrhythmia  Pt arrived for scheduled SVT ablation. PST performed on 1/11/2022. Labs reviewed. NPO > 10 hours confirmed. Last Xarelto dose yesterday morning.    Pt is a 55 year old woman with PMH of asthma, breast cancer s/p reconstruction, HTN, obesity s/p bariatric surgery, SVT and recently diagnosed atrial fibrillation. She has a long history of SVT which had previously been terminated with adenosine and vagal maneuvers. Recently she had severe palpitation and was found to have AF and atrial flutter requiring hospital admission. She presents electively todat for SVT ablation.    Cardiologist: Dr. Thompson    Cardiology summary:  ECG: 10/27/21 sinus rhythm 69 bpm, narrow QRS, nml intervals   Stress Test: ETT 9/17/21 negative for ischemia or induced arrhythmia  Pt arrived for scheduled SVT ablation. PST performed on 1/11/2022. Labs reviewed. NPO > 10 hours confirmed. Last Xarelto dose yesterday morning.    Pt is a 56 year old woman with PMH of asthma, breast cancer s/p reconstruction, HTN, obesity s/p bariatric surgery, SVT and recently diagnosed atrial fibrillation. She has a long history of SVT which had previously been terminated with adenosine and vagal maneuvers. Recently she had severe palpitation and was found to have AF and atrial flutter requiring hospital admission. She presents electively todat for SVT ablation.    Cardiologist: Dr. Thompson    Cardiology summary:  ECG: 10/27/21 sinus rhythm 69 bpm, narrow QRS, nml intervals   Stress Test: ETT 9/17/21 negative for ischemia or induced arrhythmia

## 2022-01-18 NOTE — DISCHARGE NOTE NURSING/CASE MANAGEMENT/SOCIAL WORK - NSDCFUADDAPPT_GEN_ALL_CORE_FT
Our office will contact you in 3-5 days to schedule this appointment. Please call [phone] with questions or concerns.

## 2022-01-19 ENCOUNTER — NON-APPOINTMENT (OUTPATIENT)
Age: 57
End: 2022-01-19

## 2022-01-19 RX ORDER — UBIDECARENONE/VIT E ACET 100MG-5
100 CAPSULE ORAL
Refills: 0 | Status: ACTIVE | COMMUNITY
Start: 2022-01-19

## 2022-01-19 RX ORDER — METOPROLOL SUCCINATE 25 MG/1
25 TABLET, EXTENDED RELEASE ORAL
Qty: 90 | Refills: 2 | Status: ACTIVE | COMMUNITY

## 2022-01-19 RX ORDER — CHLORHEXIDINE GLUCONATE 4 %
1000 LIQUID (ML) TOPICAL DAILY
Refills: 0 | Status: ACTIVE | COMMUNITY
Start: 2022-01-19

## 2022-01-19 RX ORDER — CHOLECALCIFEROL (VITAMIN D3) 1250 MCG
1.25 MG CAPSULE ORAL
Refills: 0 | Status: ACTIVE | COMMUNITY
Start: 2022-01-19

## 2022-02-04 PROBLEM — N20.0 CALCULUS OF KIDNEY: Chronic | Status: ACTIVE | Noted: 2022-01-11

## 2022-02-04 PROBLEM — I10 ESSENTIAL (PRIMARY) HYPERTENSION: Chronic | Status: ACTIVE | Noted: 2022-01-11

## 2022-02-04 PROBLEM — Z92.29 PERSONAL HISTORY OF OTHER DRUG THERAPY: Chronic | Status: ACTIVE | Noted: 2022-01-11

## 2022-02-09 ENCOUNTER — OUTPATIENT (OUTPATIENT)
Dept: OUTPATIENT SERVICES | Facility: HOSPITAL | Age: 57
LOS: 1 days | End: 2022-02-09
Payer: COMMERCIAL

## 2022-02-09 ENCOUNTER — APPOINTMENT (OUTPATIENT)
Dept: ELECTROPHYSIOLOGY | Facility: CLINIC | Age: 57
End: 2022-02-09
Payer: COMMERCIAL

## 2022-02-09 VITALS
SYSTOLIC BLOOD PRESSURE: 104 MMHG | HEIGHT: 65 IN | WEIGHT: 164 LBS | BODY MASS INDEX: 27.32 KG/M2 | DIASTOLIC BLOOD PRESSURE: 74 MMHG | HEART RATE: 68 BPM

## 2022-02-09 VITALS
HEART RATE: 75 BPM | OXYGEN SATURATION: 99 % | SYSTOLIC BLOOD PRESSURE: 110 MMHG | RESPIRATION RATE: 14 BRPM | TEMPERATURE: 96 F | HEIGHT: 64 IN | WEIGHT: 160.06 LBS | DIASTOLIC BLOOD PRESSURE: 74 MMHG

## 2022-02-09 DIAGNOSIS — Z01.818 ENCOUNTER FOR OTHER PREPROCEDURAL EXAMINATION: ICD-10-CM

## 2022-02-09 DIAGNOSIS — Z90.3 ACQUIRED ABSENCE OF STOMACH [PART OF]: Chronic | ICD-10-CM

## 2022-02-09 DIAGNOSIS — Z90.49 ACQUIRED ABSENCE OF OTHER SPECIFIED PARTS OF DIGESTIVE TRACT: Chronic | ICD-10-CM

## 2022-02-09 DIAGNOSIS — M75.41 IMPINGEMENT SYNDROME OF RIGHT SHOULDER: ICD-10-CM

## 2022-02-09 DIAGNOSIS — Z90.710 ACQUIRED ABSENCE OF BOTH CERVIX AND UTERUS: Chronic | ICD-10-CM

## 2022-02-09 DIAGNOSIS — Z98.84 BARIATRIC SURGERY STATUS: Chronic | ICD-10-CM

## 2022-02-09 DIAGNOSIS — Z98.51 TUBAL LIGATION STATUS: Chronic | ICD-10-CM

## 2022-02-09 DIAGNOSIS — Z98.89 OTHER SPECIFIED POSTPROCEDURAL STATES: Chronic | ICD-10-CM

## 2022-02-09 DIAGNOSIS — Z98.890 OTHER SPECIFIED POSTPROCEDURAL STATES: Chronic | ICD-10-CM

## 2022-02-09 LAB
ALBUMIN SERPL ELPH-MCNC: 3.6 G/DL — SIGNIFICANT CHANGE UP (ref 3.3–5)
ALP SERPL-CCNC: 77 U/L — SIGNIFICANT CHANGE UP (ref 30–120)
ALT FLD-CCNC: 31 U/L DA — SIGNIFICANT CHANGE UP (ref 10–60)
ANION GAP SERPL CALC-SCNC: 8 MMOL/L — SIGNIFICANT CHANGE UP (ref 5–17)
AST SERPL-CCNC: 19 U/L — SIGNIFICANT CHANGE UP (ref 10–40)
BILIRUB SERPL-MCNC: 0.7 MG/DL — SIGNIFICANT CHANGE UP (ref 0.2–1.2)
BUN SERPL-MCNC: 18 MG/DL — SIGNIFICANT CHANGE UP (ref 7–23)
CALCIUM SERPL-MCNC: 8.9 MG/DL — SIGNIFICANT CHANGE UP (ref 8.4–10.5)
CHLORIDE SERPL-SCNC: 101 MMOL/L — SIGNIFICANT CHANGE UP (ref 96–108)
CO2 SERPL-SCNC: 33 MMOL/L — HIGH (ref 22–31)
CREAT SERPL-MCNC: 1.12 MG/DL — SIGNIFICANT CHANGE UP (ref 0.5–1.3)
GLUCOSE SERPL-MCNC: 81 MG/DL — SIGNIFICANT CHANGE UP (ref 70–99)
HCT VFR BLD CALC: 38.5 % — SIGNIFICANT CHANGE UP (ref 34.5–45)
HGB BLD-MCNC: 11.9 G/DL — SIGNIFICANT CHANGE UP (ref 11.5–15.5)
MCHC RBC-ENTMCNC: 26.6 PG — LOW (ref 27–34)
MCHC RBC-ENTMCNC: 30.9 GM/DL — LOW (ref 32–36)
MCV RBC AUTO: 86.1 FL — SIGNIFICANT CHANGE UP (ref 80–100)
NRBC # BLD: 0 /100 WBCS — SIGNIFICANT CHANGE UP (ref 0–0)
PLATELET # BLD AUTO: 281 K/UL — SIGNIFICANT CHANGE UP (ref 150–400)
POTASSIUM SERPL-MCNC: 2.8 MMOL/L — CRITICAL LOW (ref 3.5–5.3)
POTASSIUM SERPL-SCNC: 2.8 MMOL/L — CRITICAL LOW (ref 3.5–5.3)
PROT SERPL-MCNC: 7.5 G/DL — SIGNIFICANT CHANGE UP (ref 6–8.3)
RBC # BLD: 4.47 M/UL — SIGNIFICANT CHANGE UP (ref 3.8–5.2)
RBC # FLD: 14.6 % — HIGH (ref 10.3–14.5)
SODIUM SERPL-SCNC: 142 MMOL/L — SIGNIFICANT CHANGE UP (ref 135–145)
WBC # BLD: 4.4 K/UL — SIGNIFICANT CHANGE UP (ref 3.8–10.5)
WBC # FLD AUTO: 4.4 K/UL — SIGNIFICANT CHANGE UP (ref 3.8–10.5)

## 2022-02-09 PROCEDURE — G0463: CPT

## 2022-02-09 PROCEDURE — 99215 OFFICE O/P EST HI 40 MIN: CPT

## 2022-02-09 PROCEDURE — 80053 COMPREHEN METABOLIC PANEL: CPT

## 2022-02-09 PROCEDURE — 93005 ELECTROCARDIOGRAM TRACING: CPT

## 2022-02-09 PROCEDURE — 36415 COLL VENOUS BLD VENIPUNCTURE: CPT

## 2022-02-09 PROCEDURE — 93010 ELECTROCARDIOGRAM REPORT: CPT

## 2022-02-09 PROCEDURE — 93000 ELECTROCARDIOGRAM COMPLETE: CPT

## 2022-02-09 PROCEDURE — 85027 COMPLETE CBC AUTOMATED: CPT

## 2022-02-09 NOTE — REVIEW OF SYSTEMS
[SOB] : no shortness of breath [Chest Discomfort] : no chest discomfort [Leg Claudication] : no intermittent leg claudication [Palpitations] : no palpitations [Syncope] : no syncope [Joint Pain] : joint pain [Negative] : Heme/Lymph

## 2022-02-09 NOTE — PROVIDER CONTACT NOTE (CRITICAL VALUE NOTIFICATION) - SITUATION
Spoke with PCP Dr. Mast, notified of critical K 2.8 and requested supplementation and repeat prior to surgery. Also notified Dr. Sanchez. Result faxed to both doctors.

## 2022-02-09 NOTE — H&P PST ADULT - ASSESSMENT
57 yo female is scheduled for right shoulder arthroscopy debridement subacromial decompression distal clavicle resection on 3/1/2022

## 2022-02-09 NOTE — CARDIOLOGY SUMMARY
[de-identified] : 2/9/22 sinus rhythm 68 bpm, narrow QRS\par 10/27/21 sinus rhythm 69 bpm, narrow QRS, nml intervals [de-identified] : ETT 9/17/21 negative for ischemia or induced arrhythmia  [___] : [unfilled]

## 2022-02-09 NOTE — H&P PST ADULT - NSICDXFAMILYHX_GEN_ALL_CORE_FT
FAMILY HISTORY:  Father  Still living? Unknown  FH: hypertension, Age at diagnosis: Age Unknown    Mother  Still living? Unknown  FH: type 2 diabetes, Age at diagnosis: Age Unknown    Grandparent  Still living? Unknown  FH: type 2 diabetes, Age at diagnosis: Age Unknown    Aunt  Still living? Unknown  FH: type 2 diabetes, Age at diagnosis: Age Unknown

## 2022-02-09 NOTE — PHYSICAL EXAM
[Well Developed] : well developed [Well Nourished] : well nourished [No Acute Distress] : no acute distress [Normal Conjunctiva] : normal conjunctiva [Normal S1, S2] : normal S1, S2 [Good Air Entry] : good air entry [No Respiratory Distress] : no respiratory distress  [Soft] : abdomen soft [Normal Gait] : normal gait [No Edema] : no edema [No Cyanosis] : no cyanosis [No Clubbing] : no clubbing [No Varicosities] : no varicosities [No Rash] : no rash [Moves all extremities] : moves all extremities [No Focal Deficits] : no focal deficits [Normal Speech] : normal speech [Alert and Oriented] : alert and oriented [Normal memory] : normal memory

## 2022-02-09 NOTE — H&P PST ADULT - PROBLEM SELECTOR PLAN 1
Right shoulder arthroscopy is planned for 3/1/2022  Covid testing scheduled for 2/27/2022 @ [facility]  Medical and cardiac clearances requested   patient to obtain instructions for xarelto from prescriber  pre op instructions were reviewed; best wishes offered

## 2022-02-09 NOTE — HISTORY OF PRESENT ILLNESS
[FreeTextEntry1] : 56 year old woman with history of asthma, breast cancer, SVT and paroxysmal AF, presenting for follow-up after recent SVT ablation. \par \par She has had a long history of palpitation, and in 7/2019 presented with narrow complex tachycardia. More recently she had palpitation and was noted to be in atrial fibrillation and atrial flutter. Medical therapy has been limited by low BP, and she has been on anticoagulation with Xarelto. \par \par On 1/18/22 she underwent EP study, and had easily inducible AVNRT. Slow pathway ablation was performed successfully. No arrhythmias were inducible following ablation. \par \par She was continued on Xarelto and Toprol 25mg qd after ablation. \par \par She has been feeling very well on follow up, and notes resolution of her palpitation thus far. She did have soreness in the groin, which has resolved.  \par \par She does have shoulder pain and is planned to have orthopedic surgery next month.  \par \par ECG today reveal sinus rhythm 68 bpm.

## 2022-02-09 NOTE — DISCUSSION/SUMMARY
[FreeTextEntry1] : 56 year old woman with history of asthma, breast cancer, SVT and paroxysmal AF, presenting for follow-up after recent SVT ablation. She has felt well since her SVT ablation, with no recurrent palpitation thus far. She did have atrial fibrillation noted in the past, and has therefore remained on anticoagulation. I do suspect the AF was most likely a result of her SVT, and hopefully she will not have recurrent AF now that the SVT has been ablated. However, will need further monitoring to clarify this. We did discuss monitoring options, including repeat event monitoring, vs implantable loop recorder. An ILR would be most useful for long-term surveillance, and if no recurrent AF is noted on extended follow-up, will likely be able to avoid long-term anticoagulation. She has considered this at length, and now does want to proceed with ILR implant.  \par \par -ILR implant \par \par -continue Toprol as tolerated \par \par -continue Xarelto for now. Will reevaluate risks and benefits of anticoagulation on follow-up with monitoring in place. \par \par -no cardiac/EP contraindications to proceeding with shoulder surgery. Can hold anticoagulation perioperatively at surgeon’s discretion.

## 2022-02-09 NOTE — H&P PST ADULT - HISTORY OF PRESENT ILLNESS
57 yo female reports right shoulder pain for last 2 years which is exacerbated with ROM raising right arm and also when she is laying down for sleep.  She is scheduled for right shoulder arthroscopy debridement subacromial decompression distal clavicle resection on 3/1/2022 @ [facility].

## 2022-02-09 NOTE — H&P PST ADULT - NSICDXPASTSURGICALHX_GEN_ALL_CORE_FT
PAST SURGICAL HISTORY:  H/O gastric sleeve 5/2018    LAP-BAND surgery status 05/2005    S/P breast reconstruction, bilateral 2014    S/P cholecystectomy 12/1998    S/P hernia repair 2/2017    S/P hysterectomy 2014    S/P lumpectomy, right breast 11/2006 ( Radiation x 6 weeks )    S/P tubal ligation 1998

## 2022-02-09 NOTE — H&P PST ADULT - NSICDXPASTMEDICALHX_GEN_ALL_CORE_FT
PAST MEDICAL HISTORY:  2019 novel coronavirus disease (COVID-19) 12/3/2021    Asthma triggered by bronchitis; last episode 2013    COVID-19 vaccine series completed 12/3/21 + , has tested negative since then she states.    Herniated cervical disc     Herpes simplex type 2 infection     History of atrial fibrillation     History of breast cancer 2006    Hypertension     Kidney stone     Lumbar herniated disc     Obesity (BMI 30-39.9)     Osteoarthritis     Shoulder impingement, right     Sleep apnea does not use CPAP    SVT (supraventricular tachycardia)     Unspecified atrial fibrillation

## 2022-02-27 ENCOUNTER — OUTPATIENT (OUTPATIENT)
Dept: OUTPATIENT SERVICES | Facility: HOSPITAL | Age: 57
LOS: 1 days | End: 2022-02-27
Payer: COMMERCIAL

## 2022-02-27 DIAGNOSIS — Z20.828 CONTACT WITH AND (SUSPECTED) EXPOSURE TO OTHER VIRAL COMMUNICABLE DISEASES: ICD-10-CM

## 2022-02-27 DIAGNOSIS — Z98.84 BARIATRIC SURGERY STATUS: Chronic | ICD-10-CM

## 2022-02-27 DIAGNOSIS — M75.41 IMPINGEMENT SYNDROME OF RIGHT SHOULDER: ICD-10-CM

## 2022-02-27 DIAGNOSIS — Z01.818 ENCOUNTER FOR OTHER PREPROCEDURAL EXAMINATION: ICD-10-CM

## 2022-02-27 DIAGNOSIS — Z98.51 TUBAL LIGATION STATUS: Chronic | ICD-10-CM

## 2022-02-27 DIAGNOSIS — Z90.3 ACQUIRED ABSENCE OF STOMACH [PART OF]: Chronic | ICD-10-CM

## 2022-02-27 DIAGNOSIS — Z90.710 ACQUIRED ABSENCE OF BOTH CERVIX AND UTERUS: Chronic | ICD-10-CM

## 2022-02-27 DIAGNOSIS — Z98.890 OTHER SPECIFIED POSTPROCEDURAL STATES: Chronic | ICD-10-CM

## 2022-02-27 DIAGNOSIS — Z98.89 OTHER SPECIFIED POSTPROCEDURAL STATES: Chronic | ICD-10-CM

## 2022-02-27 DIAGNOSIS — Z90.49 ACQUIRED ABSENCE OF OTHER SPECIFIED PARTS OF DIGESTIVE TRACT: Chronic | ICD-10-CM

## 2022-02-27 LAB — SARS-COV-2 RNA SPEC QL NAA+PROBE: SIGNIFICANT CHANGE UP

## 2022-02-27 PROCEDURE — U0003: CPT

## 2022-02-27 PROCEDURE — U0005: CPT

## 2022-02-28 ENCOUNTER — TRANSCRIPTION ENCOUNTER (OUTPATIENT)
Age: 57
End: 2022-02-28

## 2022-02-28 NOTE — ASU PATIENT PROFILE, ADULT - FALL HARM RISK - HARM RISK INTERVENTIONS

## 2022-03-01 ENCOUNTER — OUTPATIENT (OUTPATIENT)
Dept: OUTPATIENT SERVICES | Facility: HOSPITAL | Age: 57
LOS: 1 days | End: 2022-03-01
Payer: COMMERCIAL

## 2022-03-01 VITALS
WEIGHT: 167.99 LBS | HEIGHT: 64 IN | TEMPERATURE: 98 F | OXYGEN SATURATION: 100 % | HEART RATE: 77 BPM | RESPIRATION RATE: 12 BRPM | SYSTOLIC BLOOD PRESSURE: 137 MMHG | DIASTOLIC BLOOD PRESSURE: 82 MMHG

## 2022-03-01 VITALS
DIASTOLIC BLOOD PRESSURE: 65 MMHG | SYSTOLIC BLOOD PRESSURE: 121 MMHG | HEART RATE: 63 BPM | RESPIRATION RATE: 14 BRPM | TEMPERATURE: 98 F | OXYGEN SATURATION: 97 %

## 2022-03-01 DIAGNOSIS — Z98.890 OTHER SPECIFIED POSTPROCEDURAL STATES: Chronic | ICD-10-CM

## 2022-03-01 DIAGNOSIS — Z90.710 ACQUIRED ABSENCE OF BOTH CERVIX AND UTERUS: Chronic | ICD-10-CM

## 2022-03-01 DIAGNOSIS — Z98.89 OTHER SPECIFIED POSTPROCEDURAL STATES: Chronic | ICD-10-CM

## 2022-03-01 DIAGNOSIS — Z01.818 ENCOUNTER FOR OTHER PREPROCEDURAL EXAMINATION: ICD-10-CM

## 2022-03-01 DIAGNOSIS — Z98.84 BARIATRIC SURGERY STATUS: Chronic | ICD-10-CM

## 2022-03-01 DIAGNOSIS — M75.41 IMPINGEMENT SYNDROME OF RIGHT SHOULDER: ICD-10-CM

## 2022-03-01 DIAGNOSIS — Z98.51 TUBAL LIGATION STATUS: Chronic | ICD-10-CM

## 2022-03-01 DIAGNOSIS — Z90.49 ACQUIRED ABSENCE OF OTHER SPECIFIED PARTS OF DIGESTIVE TRACT: Chronic | ICD-10-CM

## 2022-03-01 DIAGNOSIS — Z90.3 ACQUIRED ABSENCE OF STOMACH [PART OF]: Chronic | ICD-10-CM

## 2022-03-01 PROCEDURE — 29826 SHO ARTHRS SRG DECOMPRESSION: CPT | Mod: RT

## 2022-03-01 PROCEDURE — 29824 SHO ARTHRS SRG DSTL CLAVICLC: CPT | Mod: RT

## 2022-03-01 PROCEDURE — 29827 SHO ARTHRS SRG RT8TR CUF RPR: CPT | Mod: RT

## 2022-03-01 RX ORDER — ONDANSETRON 8 MG/1
4 TABLET, FILM COATED ORAL ONCE
Refills: 0 | Status: DISCONTINUED | OUTPATIENT
Start: 2022-03-01 | End: 2022-03-01

## 2022-03-01 RX ORDER — RIVAROXABAN 15 MG-20MG
0 KIT ORAL
Qty: 0 | Refills: 0 | DISCHARGE

## 2022-03-01 RX ORDER — CEFAZOLIN SODIUM 1 G
1000 VIAL (EA) INJECTION ONCE
Refills: 0 | Status: DISCONTINUED | OUTPATIENT
Start: 2022-03-01 | End: 2022-03-01

## 2022-03-01 RX ORDER — ACETAMINOPHEN 500 MG
1000 TABLET ORAL ONCE
Refills: 0 | Status: COMPLETED | OUTPATIENT
Start: 2022-03-01 | End: 2022-03-01

## 2022-03-01 RX ORDER — SODIUM CHLORIDE 9 MG/ML
1000 INJECTION, SOLUTION INTRAVENOUS
Refills: 0 | Status: DISCONTINUED | OUTPATIENT
Start: 2022-03-01 | End: 2022-03-01

## 2022-03-01 RX ORDER — CEFAZOLIN SODIUM 1 G
1000 VIAL (EA) INJECTION ONCE
Refills: 0 | Status: COMPLETED | OUTPATIENT
Start: 2022-03-01 | End: 2022-03-01

## 2022-03-01 RX ORDER — APREPITANT 80 MG/1
40 CAPSULE ORAL ONCE
Refills: 0 | Status: COMPLETED | OUTPATIENT
Start: 2022-03-01 | End: 2022-03-01

## 2022-03-01 RX ORDER — CHLORHEXIDINE GLUCONATE 213 G/1000ML
1 SOLUTION TOPICAL ONCE
Refills: 0 | Status: COMPLETED | OUTPATIENT
Start: 2022-03-01 | End: 2022-03-01

## 2022-03-01 RX ORDER — CEFAZOLIN SODIUM 1 G
2000 VIAL (EA) INJECTION ONCE
Refills: 0 | Status: COMPLETED | OUTPATIENT
Start: 2022-03-01 | End: 2022-03-01

## 2022-03-01 RX ADMIN — Medication 100 MILLIGRAM(S): at 12:31

## 2022-03-01 RX ADMIN — SODIUM CHLORIDE 75 MILLILITER(S): 9 INJECTION, SOLUTION INTRAVENOUS at 12:32

## 2022-03-01 RX ADMIN — CHLORHEXIDINE GLUCONATE 1 APPLICATION(S): 213 SOLUTION TOPICAL at 07:57

## 2022-03-01 RX ADMIN — APREPITANT 40 MILLIGRAM(S): 80 CAPSULE ORAL at 07:56

## 2022-03-01 NOTE — BRIEF OPERATIVE NOTE - NSICDXBRIEFPOSTOP_GEN_ALL_CORE_FT
POST-OP DIAGNOSIS:  Subacromial impingement, right 01-Mar-2022 11:01:31  Marci Martel  Tear of right rotator cuff 01-Mar-2022 11:01:39  Marci Martel

## 2022-03-01 NOTE — ASU DISCHARGE PLAN (ADULT/PEDIATRIC) - NS MD DC FALL RISK RISK
For information on Fall & Injury Prevention, visit: [email or web address] OR  [email or web address] OR  https://www.cdc.gov/steadi/patient.html

## 2022-03-01 NOTE — ASU DISCHARGE PLAN (ADULT/PEDIATRIC) - CARE PROVIDER_API CALL
Lupillo Sanchez)  Orthopaedic Surgery  [facility] [place] [address]  Phone: [phone]  Fax: [phone]  Follow Up Time:

## 2022-03-01 NOTE — BRIEF OPERATIVE NOTE - NSICDXBRIEFPROCEDURE_GEN_ALL_CORE_FT
PROCEDURES:  Right shoulder arthroscopy 01-Mar-2022 10:59:44  Marci Martel  Arthroscopic repair of right rotator cuff 01-Mar-2022 10:59:56  Marci Martel  Acromioplasty of right shoulder with excision of distal clavicle 01-Mar-2022 11:00:53  Marci Martel

## 2022-03-01 NOTE — BRIEF OPERATIVE NOTE - NSICDXBRIEFPREOP_GEN_ALL_CORE_FT
PRE-OP DIAGNOSIS:  Tear of right rotator cuff 01-Mar-2022 11:01:08  Marci Martel  Subacromial impingement, right 01-Mar-2022 11:01:18  Marci Martel

## 2022-03-26 PROBLEM — M50.20 OTHER CERVICAL DISC DISPLACEMENT, UNSPECIFIED CERVICAL REGION: Chronic | Status: ACTIVE | Noted: 2022-01-11

## 2022-03-26 PROBLEM — M51.26 OTHER INTERVERTEBRAL DISC DISPLACEMENT, LUMBAR REGION: Chronic | Status: ACTIVE | Noted: 2022-02-09

## 2022-03-26 PROBLEM — Z86.79 PERSONAL HISTORY OF OTHER DISEASES OF THE CIRCULATORY SYSTEM: Chronic | Status: ACTIVE | Noted: 2022-02-09

## 2022-03-26 PROBLEM — U07.1 COVID-19: Chronic | Status: ACTIVE | Noted: 2022-01-11

## 2022-04-05 ENCOUNTER — FORM ENCOUNTER (OUTPATIENT)
Age: 57
End: 2022-04-05

## 2022-04-08 ENCOUNTER — APPOINTMENT (OUTPATIENT)
Dept: ORTHOPEDIC SURGERY | Facility: CLINIC | Age: 57
End: 2022-04-08
Payer: COMMERCIAL

## 2022-04-08 VITALS — BODY MASS INDEX: 27.83 KG/M2 | WEIGHT: 163 LBS | HEIGHT: 64 IN

## 2022-04-08 PROCEDURE — 99024 POSTOP FOLLOW-UP VISIT: CPT

## 2022-04-08 RX ORDER — METHYLPREDNISOLONE 4 MG/1
4 TABLET ORAL
Qty: 1 | Refills: 0 | Status: ACTIVE | COMMUNITY
Start: 2022-04-08 | End: 1900-01-01

## 2022-04-08 NOTE — PHYSICAL EXAM
[Right] : right shoulder [Sitting] : sitting [Mild] : mild [] : no sensory deficits [FreeTextEntry3] : There is no calor. [de-identified] : Strenght is  [TWNoteComboBox4] : passive forward flexion 160 degrees [de-identified] : external rotation 45 degrees
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
## 2022-04-08 NOTE — ASSESSMENT
[FreeTextEntry1] : Course outlined. \par Medrol is prescribed.\par Continue PT. \par Questions addressed. \par \par Patient seen by Dr.Ticker CHA.\par Lupillo Sanchez MD\par Shoulder Surgery\par \par The documentation recorded by the scribe accurately reflects the service I personally performed and the decisions made by me.\par Entered by Calvin Alvarez acting as scribe.\par \par

## 2022-04-08 NOTE — HISTORY OF PRESENT ILLNESS
[] : Post Surgical Visit: yes [de-identified] : pt is here for a post operative visit of her right shoulder. pt states she still has a lot of pain at night but physical therapy has helped with her range of motion. pt states she gets pain randomly at the top of her shoulder  [FreeTextEntry1] : right shoulder [de-identified] : physical therapy [de-identified] : 03/01/2022 [de-identified] : right shoulder arthroscopy

## 2022-04-11 ENCOUNTER — APPOINTMENT (OUTPATIENT)
Dept: PAIN MANAGEMENT | Facility: CLINIC | Age: 57
End: 2022-04-11

## 2022-04-15 ENCOUNTER — APPOINTMENT (OUTPATIENT)
Dept: ORTHOPEDIC SURGERY | Facility: CLINIC | Age: 57
End: 2022-04-15
Payer: COMMERCIAL

## 2022-04-15 VITALS — HEIGHT: 64 IN | BODY MASS INDEX: 27.83 KG/M2 | WEIGHT: 163 LBS

## 2022-04-15 PROCEDURE — 99024 POSTOP FOLLOW-UP VISIT: CPT

## 2022-04-15 NOTE — PHYSICAL EXAM
[Right] : right shoulder [Orientated] : orientated [Able to Communicate] : able to communicate [Supine] : supine [Mild] : mild [] : no sensory deficits [FreeTextEntry3] : There is no calor. [de-identified] : Strength was not assessed. [TWNoteComboBox4] : passive forward flexion 140 degrees [de-identified] : external rotation 40 degrees

## 2022-04-15 NOTE — HISTORY OF PRESENT ILLNESS
[] : Post Surgical Visit: yes [4] : 4 [FreeTextEntry1] : right shoulder [de-identified] : physical therapy [de-identified] : 03/01/2022 [de-identified] : right shoulder arthroscopy  [de-identified] : pt is here for a post operative visit of her right shoulder. pt states she still has a lot of pain at night but physical therapy has helped with her range of motion. pt states she gets pain randomly at the top of her shoulder
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
## 2022-04-15 NOTE — ASSESSMENT
[FreeTextEntry1] : We discussed her issues.\par She has more comfort today.\par I had spoken with Joana in the ER yesterday.\par Decrease sling use discussed.\par Questions answered.\par Resume PT and HEP.

## 2022-04-19 ENCOUNTER — APPOINTMENT (OUTPATIENT)
Dept: PAIN MANAGEMENT | Facility: CLINIC | Age: 57
End: 2022-04-19

## 2022-04-20 ENCOUNTER — APPOINTMENT (OUTPATIENT)
Dept: ORTHOPEDIC SURGERY | Facility: CLINIC | Age: 57
End: 2022-04-20

## 2022-04-20 ENCOUNTER — APPOINTMENT (OUTPATIENT)
Dept: PAIN MANAGEMENT | Facility: CLINIC | Age: 57
End: 2022-04-20
Payer: COMMERCIAL

## 2022-04-20 VITALS — WEIGHT: 163 LBS | HEIGHT: 64 IN | BODY MASS INDEX: 27.83 KG/M2

## 2022-04-20 PROCEDURE — 99214 OFFICE O/P EST MOD 30 MIN: CPT

## 2022-04-20 NOTE — PHYSICAL EXAM
[] : trapezial tenderness [Flexion] : flexion [Extension] : extension [Rotation to left] : rotation to left [Rotation to right] : rotation to right [4___] : right triceps 4[unfilled]/5

## 2022-04-20 NOTE — HISTORY OF PRESENT ILLNESS
[Neck] : neck [7] : 7 [3] : 3 [Sharp] : sharp [Shooting] : shooting [Stabbing] : stabbing [Throbbing] : throbbing [] : yes [Intermittent] : intermittent [Meds] : meds [Lying in bed] : lying in bed [FreeTextEntry1] : 4/20/22- Patient here for follow up.  Had shoulder surgery March 1 with Dr. Sanchez.  Now has been having pain in neck and radiating to both hands.  Is on xarelto for a.fib.\par \par 5/7/21: follow up today after b/l lumbar MBB on 4/22/21. she had an overall 80-90% improvement which is sustained.\par has some mild pain on the left lower back and left buttock.\par 4/16/21: follow up today after LESI L5/S1 on 3/3/21. Pt reports an overall 30%-50% improvement. Pain in the left lower\par back. Radicular pain much improved. will f/u with Dr. Grimm as her EMG did reveal Cervical radiculopathy, CTS and\par Cubital tunnel syndrome. She is waiting to get MRI of the left shoulder.\par 2/17/21- Patient had 70% relief from NICKIE. Pain has returned in lower back. Will repeat LESI.\par Patient had 85% improvement from LESI. Had 60% relief from NICKIE. Still has some discomfort in neck. [FreeTextEntry7] : right shoulder down pass the elbow

## 2022-04-20 NOTE — ASSESSMENT
[FreeTextEntry1] : C7-T1 Cervical Epidural Steroid Injection under fluoroscopic guidance with image.\par \par After discussing various treatment options with the patient including but not limited to oral medications, physical therapy, exercise, modalities as well as interventional spinal injections, we have decided with the following plan:\par I personally reviewed the MRI/CT scan images and agree with the radiologist's report. The radiological findings were discussed with the patient.\par The risks, benefits, contents and alternatives to injection were explained in full to the patient. Risks outlined include but are not limited to infection,sepsis, bleeding, post-dural puncture headache, nerve damage, temporary increase in pain, syncopal episode, failure to resolve symptoms, allergic reaction, symptom recurrence, and elevation of blood sugar in diabetics. Cortisone may cause immunosuppression. Patient understands the risks. All questions were answered. After discussion of options, patient requested an injection. Information regarding the injection was given to the patient. Which medications to stop prior to the injection was explained to the patient as well.\par Follow up in 1-2 weeks post injection for re-evaluation. \par Continue Home exercises, stretching, activity modification, physical therapy, and conservative care.\par Patient is presenting with acute/sub-acute radicular pain with impairment in ADLs and functionality. The pain has not responded to conservative care including nsaid therapy and/or physical therapy. There is no bleeding tendency, unstable medical condition, or systemic infection.\par \par

## 2022-05-06 ENCOUNTER — OUTPATIENT (OUTPATIENT)
Dept: OUTPATIENT SERVICES | Facility: HOSPITAL | Age: 57
LOS: 1 days | Discharge: HOME | End: 2022-05-06
Payer: COMMERCIAL

## 2022-05-06 ENCOUNTER — TRANSCRIPTION ENCOUNTER (OUTPATIENT)
Age: 57
End: 2022-05-06

## 2022-05-06 VITALS
HEIGHT: 64 IN | DIASTOLIC BLOOD PRESSURE: 57 MMHG | OXYGEN SATURATION: 98 % | RESPIRATION RATE: 18 BRPM | WEIGHT: 162.92 LBS | SYSTOLIC BLOOD PRESSURE: 117 MMHG | HEART RATE: 78 BPM

## 2022-05-06 DIAGNOSIS — Z98.89 OTHER SPECIFIED POSTPROCEDURAL STATES: Chronic | ICD-10-CM

## 2022-05-06 DIAGNOSIS — I48.91 UNSPECIFIED ATRIAL FIBRILLATION: ICD-10-CM

## 2022-05-06 DIAGNOSIS — Z90.49 ACQUIRED ABSENCE OF OTHER SPECIFIED PARTS OF DIGESTIVE TRACT: Chronic | ICD-10-CM

## 2022-05-06 DIAGNOSIS — Z90.3 ACQUIRED ABSENCE OF STOMACH [PART OF]: Chronic | ICD-10-CM

## 2022-05-06 DIAGNOSIS — Z98.84 BARIATRIC SURGERY STATUS: Chronic | ICD-10-CM

## 2022-05-06 DIAGNOSIS — Z90.710 ACQUIRED ABSENCE OF BOTH CERVIX AND UTERUS: Chronic | ICD-10-CM

## 2022-05-06 DIAGNOSIS — Z98.890 OTHER SPECIFIED POSTPROCEDURAL STATES: Chronic | ICD-10-CM

## 2022-05-06 DIAGNOSIS — Z98.51 TUBAL LIGATION STATUS: Chronic | ICD-10-CM

## 2022-05-06 PROCEDURE — 33285 INSJ SUBQ CAR RHYTHM MNTR: CPT

## 2022-05-06 PROCEDURE — C1764: CPT

## 2022-05-06 RX ORDER — CEPHALEXIN 500 MG
500 CAPSULE ORAL ONCE
Refills: 0 | Status: DISCONTINUED | OUTPATIENT
Start: 2022-05-06 | End: 2022-05-20

## 2022-05-06 NOTE — DISCHARGE NOTE NURSING/CASE MANAGEMENT/SOCIAL WORK - PATIENT PORTAL LINK FT
You can access the FollowMyHealth Patient Portal offered by [facility] by registering at the following website: [email or web address]. By joining [employer]’s FollowMyHealth portal, you will also be able to view your health information using other applications (apps) compatible with our system.

## 2022-05-06 NOTE — ASU DISCHARGE PLAN (ADULT/PEDIATRIC) - CARE PROVIDER_API CALL
Michael Gonzalez)  Cardiology; Internal Medicine  [address], [address]  [place] [address]  Phone: [phone]  Fax: [phone]  Follow Up Time:     Monroe Thompson)  Cardiovascular Disease; Internal Medicine  [facility], [address], [address]  [place] [address]  Phone: [phone]  Fax: [phone]  Follow Up Time:

## 2022-05-06 NOTE — ASU DISCHARGE PLAN (ADULT/PEDIATRIC) - NS MD DC FALL RISK RISK
For information on Fall & Injury Prevention, visit: [email or web address] OR  [email or web address] OR  https://www.cdc.gov/steadi/patient.html

## 2022-05-06 NOTE — H&P PST ADULT - ASSESSMENT
55 y/o f, PMHx of  asthma, breast cancer, SVT and paroxysmal AF, presents today for loop recorder implant with Dr. Gonzalez. Pt has had a long history of palpitation and on 1/18/22 she underwent EP study, and had easily inducible AVNRT, for which a slow pathway ablation was performed successfully. No arrhythmias were inducible following ablation. Pt has felt well since her SVT ablation and reports infrequent palpitations when she feels dehydrated. Pt has been maintained on Xarelto for her paroxsymal atrial fibrillation. Pt has elected to proceed with loop recorder implant at this time for further surveillance of atrial fibrillation. Pt reports no complaints at time of arrival today. Denies palpitations, chest pain, dizziness, SOB. COVID-19 PCR negative 5/5/22.     Plan  1) Loop recorder implant   - Consent with attending  - 500mg keflex prior to schedule   - Continue to monitor in CCL pending procedure.

## 2022-05-06 NOTE — ASU DISCHARGE PLAN (ADULT/PEDIATRIC) - COMMENTS
Follow up with Chhaya Remy NP at [facility] in 2-3 weeks for loop recorder check. Please call [phone] to schedule an appointment.

## 2022-05-06 NOTE — H&P PST ADULT - HISTORY OF PRESENT ILLNESS
57 y/o f, PMHx of  asthma, breast cancer, SVT and paroxysmal AF, presents today for loop recorder implant with Dr. Gonzalez. Pt has had a long history of palpitation and on 22 she underwent EP study, and had easily inducible AVNRT, for which a slow pathway ablation was performed successfully. No arrhythmias were inducible following ablation. Pt has felt well since her SVT ablation and reports infrequent palpitations when she feels dehydrated. Pt has been maintained on Xarelto for her paroxsymal atrial fibrillation. Pt has elected to proceed with loop recorder implant at this time for further surveillance of atrial fibrillation. Pt reports no complaints at time of arrival today. Denies palpitations, chest pain, dizziness, SOB.       Cardiology Summary:    EC22 sinus rhythm 68 bpm, narrow QRS  10/27/21 sinus rhythm 69 bpm, narrow QRS, nml intervals   Stress Test: ETT 21 negative for ischemia or induced arrhythmia   EK19, sinus rhtyhm 70 bpm, nml intervals, no preexcitation   Stress Test: 3/1/18, breast attenuation. LVEF 68%. Normal nuclear stress.

## 2022-05-16 DIAGNOSIS — I48.19 OTHER PERSISTENT ATRIAL FIBRILLATION: ICD-10-CM

## 2022-06-06 ENCOUNTER — APPOINTMENT (OUTPATIENT)
Dept: PAIN MANAGEMENT | Facility: CLINIC | Age: 57
End: 2022-06-06
Payer: COMMERCIAL

## 2022-06-06 PROCEDURE — 62321 NJX INTERLAMINAR CRV/THRC: CPT

## 2022-06-06 NOTE — PROCEDURE
[FreeTextEntry3] : Date of Service: 06/06/2022 \par \par Account: [id number]\par \par Patient: NICKOLAS TORRES \par \par YOB: 1965\par \par Age: 56 year\par \par \par Surgeon:                                                      Laly Ngo M.D.\par \par Pre-Operative Diagnosis:                          Cervical Radiculopathy (M54.12) \par \par Post Operative Diagnosis:                        Cervical Radiculopathy (M54.12)\par \par Procedure:                                                  Interlaminar cervical epidural steroid injection (C7-T1) under fluoroscopic guidance\par \par Anesthesia:                                                 MAC\par \par \par This procedure was carried out using fluoroscopic guidance.  The risks and benefits of the procedure were discussed extensively with the patient.  The consent of the patient was obtained and the following procedure was performed.\par \par The patient was placed in the prone position using a thoracic and chin support.  The cervical area was prepped and draped in a sterile fashion.  The fluoroscope visualized the C7-T1 interspace using slight cephalad-caudad angulation and this area was marked.  Using sterile technique the superficial skin was anesthetized with 1% Lidocaine without epinephrine.  A 18 gauge Tuohoy needle was advanced under fluoroscopy using [id number] technique until ligament was engaged.  The stilette was then removed and a column of preservative free normal saline flushed throught the tuohoy needle and left with a concave fluid level above the butterfly portion of the tuohoy needle.  The needle was then advanced under fluoroscopic guidance until the column of saline disappeared.  Lateral view confirmed final needle tip placement in the epidural space.  After negative aspiration for heme and CSF, 1 cc of Omnipaque confirmed good cervical epiduragram. \par \par Cervical epidurogram showed no evidence of intrathecal or intravascular flow, and good bilateral epidural flow from C3 to T2 levels.  An injectate of 6 cc of preservative free normal saline plus 12 mg of betamethasone was then injected into the epidural space. The needle was subsequently removed and pressure was applied.\par \par Anesthesia personnel were present throughout the procedure.  The patient tolerated the procedure well and was instructed to contact me immediately if there were any problems.\par \par Laly Ngo M.D.\par

## 2022-06-09 LAB — SARS-COV-2 N GENE NPH QL NAA+PROBE: NOT DETECTED

## 2022-06-10 NOTE — ASU PATIENT PROFILE, ADULT - MEDICATION ADMINISTRATION INFO, PROFILE
She's also on a decent dose of lasix, which would make her lose potassium. Potassium normal less than a month ago, and we can plan to recheck it in another 2-3 weeks   no concerns

## 2022-06-20 ENCOUNTER — APPOINTMENT (OUTPATIENT)
Dept: ORTHOPEDIC SURGERY | Facility: CLINIC | Age: 57
End: 2022-06-20
Payer: COMMERCIAL

## 2022-06-20 VITALS — HEIGHT: 64 IN | BODY MASS INDEX: 27.83 KG/M2 | WEIGHT: 163 LBS

## 2022-06-20 PROCEDURE — 99214 OFFICE O/P EST MOD 30 MIN: CPT

## 2022-06-20 NOTE — HISTORY OF PRESENT ILLNESS
[] : Post Surgical Visit: yes [5] : 5 [1] : 2 [de-identified] : pt is here today for a follow up of her right shoulder. pt states that her pain is better since last visit  [FreeTextEntry1] : right shoulder [de-identified] : home exercises  [de-identified] : 03/01/2022 [de-identified] : right shoulder arthroscopy
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
## 2022-06-20 NOTE — ASSESSMENT
[FreeTextEntry1] : More PT is a medical necessity.\par She has to strengthen.\par Questions answered.\par Meds outlined.

## 2022-06-20 NOTE — PHYSICAL EXAM
[Right] : right shoulder [Sitting] : sitting [Mild] : mild [] : no sensory deficits [de-identified] : Strength was not stressed. [de-identified] : +arc [TWNoteComboBox7] : active forward flexion 150 degrees [TWNoteComboBox4] : passive forward flexion 165 degrees [de-identified] : external rotation 50 degrees

## 2022-06-26 ENCOUNTER — FORM ENCOUNTER (OUTPATIENT)
Age: 57
End: 2022-06-26

## 2022-06-27 ENCOUNTER — APPOINTMENT (OUTPATIENT)
Dept: PAIN MANAGEMENT | Facility: CLINIC | Age: 57
End: 2022-06-27
Payer: COMMERCIAL

## 2022-06-27 VITALS — WEIGHT: 163 LBS | HEIGHT: 64 IN | BODY MASS INDEX: 27.83 KG/M2

## 2022-06-27 PROCEDURE — 99214 OFFICE O/P EST MOD 30 MIN: CPT

## 2022-06-27 NOTE — HISTORY OF PRESENT ILLNESS
[Neck] : neck [] : yes [Intermittent] : intermittent [Meds] : meds [Lying in bed] : lying in bed [2] : 2 [Dull/Aching] : dull/aching [Sleep] : sleep [Injection therapy] : injection therapy [FreeTextEntry1] : 06/27/2022: follow up today.  Had 60% relief from NICKIE.  Will start PT.  Will schedule lumbar MBB #2.\par \par 4/20/22- Patient here for follow up.  Had shoulder surgery March 1 with Dr. Sanchez.  Now has been having pain in neck and radiating to both hands.  Is on xarelto for a.fib.\par \par 5/7/21: follow up today after b/l lumbar MBB on 4/22/21. she had an overall 80-90% improvement which is sustained.\par has some mild pain on the left lower back and left buttock.\par 4/16/21: follow up today after LESI L5/S1 on 3/3/21. Pt reports an overall 30%-50% improvement. Pain in the left lower\par back. Radicular pain much improved. will f/u with Dr. Grimm as her EMG did reveal Cervical radiculopathy, CTS and\par Cubital tunnel syndrome. She is waiting to get MRI of the left shoulder.\par 2/17/21- Patient had 70% relief from NICKIE. Pain has returned in lower back. Will repeat LESI.\par Patient had 85% improvement from LESI. Had 60% relief from NICKIE. Still has some discomfort in neck.\par \par Injections- 6/6/22 NICKIE\par  [FreeTextEntry6] : numbness [FreeTextEntry7] : left shoulder

## 2022-07-11 ENCOUNTER — APPOINTMENT (OUTPATIENT)
Dept: PAIN MANAGEMENT | Facility: CLINIC | Age: 57
End: 2022-07-11

## 2022-07-11 LAB — SARS-COV-2 N GENE NPH QL NAA+PROBE: NOT DETECTED

## 2022-07-11 PROCEDURE — 64493 INJ PARAVERT F JNT L/S 1 LEV: CPT | Mod: 50

## 2022-07-11 PROCEDURE — 64494 INJ PARAVERT F JNT L/S 2 LEV: CPT | Mod: 50

## 2022-07-11 PROCEDURE — J3490M: CUSTOM

## 2022-07-11 NOTE — PROCEDURE
[FreeTextEntry3] : Date of Service: 07/11/2022 \par \par Account: [id number]\par \par Patient: NICKOLAS TORRES \par \par YOB: 1965\par \par Age: 56 year\par \par \par Surgeon:                                      Laly Ngo M.D.\par \par Assistant:                                    None.\par \par Pre-Operative Diagnosis:            Spondylosis of Lumbar Region without Myelopathy or Radiculopathy (M47.816)    \par \par Post Operative Diagnosis:        Same  \par \par Procedure:                 Right L4-5, L5-S1 Facet block\par \par                                     Left L4-5, L5-S1 Facet block under fluoroscopic guidance\par \par  Anesthesia:                MAC\par \par \par This procedure was carried out using fluoroscopic guidance.  The risks and benefits of the procedure were discussed extensively with the patient.  The consent of the patient was obtained and the following procedure was performed.\par \par The patient was placed in the prone position.  The patient's back was prepped and draped in a sterile fashion.  The left L4 and L5 lumbar vertebral bodies were identified and the fluoroscope left obliqued to approximately 30 degrees to reveal good "Hernesto-dog" anatomical view.  The junction of the superior articulate process and tranverse process at the L4 and L5 level was then identified and marked. The skin at these target points was then localized using 1 cc of 1% Lidocaine without epinephrine at each injection site.  A spinal needle was then introduced and advanced to the above target points at the junction of the SAP and transverse processes until oss was contacted.  After negative aspiration for heme and CSF, an injectate of 1cc 0.25% marcaine and 10mg of methylprednisolone acetate was injected at each of the two levels.\par \par The right L4 and L5 lumbar vertebral bodies were identified and the fluoroscope right obliqued to approximately 30 degrees to reveal good "Hernesto-dog" anatomical view.  The junction of the superior articulate process and tranverse process at the L4 and L5 level was then identified and marked. The skin at these target points was then localized using 1 cc of 1% Lidocaine without epinephrine at each injection site.  A spinal needle was then introduced and advanced to the above target points at the junction of the SAP and transverse processes until oss was contacted.  After negative aspiration for heme and CSF, an injectate of 1cc 0.25% marcaine and 10mg of methylprednisolone acetate was injected at each of the two levels.\par \par Fluoroscope then focused on the bilateral sacral ala on AP view, and marked at these points.  The skin and subcutaneous structures were localized using 1cc of 1.0 % lidocaine without epinephrine.  A spinal needle was then advanced under fluoroscopic guidance until oss was contacted at the ala bilaterally.  After negative aspiration for heme and CSF, an injectate of 1cc 0.25% marcaine and 10mg of methylprednisolone acetate was injected at each site.\par \par The needles were then removed and pressure was applied.  Anesthesia personnel were present throughout the procedure, monitoring vitals which were stable throughout.\par \par Laly Ngo M.D.\par

## 2022-07-12 ENCOUNTER — APPOINTMENT (OUTPATIENT)
Dept: ORTHOPEDIC SURGERY | Facility: CLINIC | Age: 57
End: 2022-07-12

## 2022-07-12 PROCEDURE — 72050 X-RAY EXAM NECK SPINE 4/5VWS: CPT

## 2022-07-12 PROCEDURE — 99214 OFFICE O/P EST MOD 30 MIN: CPT

## 2022-07-12 NOTE — ASSESSMENT
[FreeTextEntry1] : C/w PT for all\par Indicated for updated imaging to discuss surgical management\par \par Gabapentin- Patient advised of sedating effects, instructed not to drive, operate machinery, or take with other sedating medications. Advised of need to taper on/off medication and risk of abruptly stopping gabapentin.
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
## 2022-07-12 NOTE — IMAGING
[Facet arthropathy] : Facet arthropathy [Disc space narrowing] : Disc space narrowing [de-identified] : C spine\par \par Inspection: No rash or ecchymosis \par \par Palpation: b/l trapezial and R rhomboid tenderness\par \par ROM: diminished all planes\par \par Strength: 4/5 R deltoid and b/l , abductors. Otherwise 5/5\par \par Sensation: diminished sensation to light touch in digits 3-4 of b/l hands \par \par \par L spine\par \par Palpation: Midline lumbar/thoracic tenderness. B/l lumbar paraspinal TTP and b/l SI joint tenderness to palpation. No greater trochanter tenderness to palpation.\par \par ROM: diminished all planes\par \par Strength: 4/5 bilateral hip flexors, knee extensors, ankle dorsiflexors, EHL, ankle plantarflexors. Limited due to pain\par \par Sensation: Sensation present to light touch bilateral L2-S1 distributions \par \par Provocative maneuvers: Positive R seated straight leg raise, equivocal left\par

## 2022-07-25 ENCOUNTER — APPOINTMENT (OUTPATIENT)
Dept: PAIN MANAGEMENT | Facility: CLINIC | Age: 57
End: 2022-07-25

## 2022-07-25 VITALS — WEIGHT: 163 LBS | BODY MASS INDEX: 27.83 KG/M2 | HEIGHT: 64 IN

## 2022-07-25 PROCEDURE — 99214 OFFICE O/P EST MOD 30 MIN: CPT

## 2022-07-25 NOTE — PHYSICAL EXAM
[Flexion] : flexion [Rotation to left] : rotation to left [Rotation to right] : rotation to right [4___] : right triceps 4[unfilled]/5 [Extension] : extension [] : positive Ruiz maneuver facet loading [TWNoteComboBox7] : forward flexion 75 degrees [de-identified] : extension 20 degrees

## 2022-07-25 NOTE — HISTORY OF PRESENT ILLNESS
[Neck] : neck [Sleep] : sleep [Meds] : meds [Injection therapy] : injection therapy [Lower back] : lower back [2] : 2 [Dull/Aching] : dull/aching [Constant] : constant [Sitting] : sitting [Bending forward] : bending forward [FreeTextEntry1] : 07/25/2022: follow up today after b/l lumbar MBB on 7/11/22. Pt with near complete resolution of her pain following. last block was over a year ago with relief. \par \par 06/27/2022: follow up today.  Had 60% relief from NICKIE.  Will start PT.  Will schedule lumbar MBB #2.\par \par 4/20/22- Patient here for follow up.  Had shoulder surgery March 1 with Dr. Sanchez.  Now has been having pain in neck and radiating to both hands.  Is on xarelto for a.fib.\par \par 5/7/21: follow up today after b/l lumbar MBB on 4/22/21. she had an overall 80-90% improvement which is sustained. has some mild pain on the left lower back and left buttock.\par \par 4/16/21: follow up today after LESI L5/S1 on 3/3/21. Pt reports an overall 30%-50% improvement. Pain in the left lower back. Radicular pain much improved. will f/u with Dr. Grimm as her EMG did reveal Cervical radiculopathy, CTS and Cubital tunnel syndrome. She is waiting to get MRI of the left shoulder.\par \par \par 2/17/21- Patient had 70% relief from NICKIE. Pain has returned in lower back. Will repeat LESI.\par Patient had 85% improvement from LESI. Had 60% relief from NICKIE. Still has some discomfort in neck.\par \par Injections- 6/6/22 NICKIE\par b/l lumbar MBB (4/22/21, 7/11/22) [] : no

## 2022-07-25 NOTE — ASSESSMENT
[FreeTextEntry1] : After discussing various treatment options with the patient including but not limited to oral medications, physical therapy, exercise, modalities as well as interventional spinal injections, we have decided with the following plan:\par \par 1) Intervention Injection Therapy:\par I personally reviewed the MRI/CT scan images and agree with the radiologist's report. The radiological findings were discussed with the patient.\par The risks, benefits, contents and alternatives to injection were explained in full to the patient. Risks outlined include but are not limited to infection,sepsis, bleeding, post-dural puncture headache, nerve damage, temporary increase in pain, syncopal episode, failure to resolve symptoms, allergic reaction, symptom recurrence, and elevation of blood sugar in diabetics. Cortisone may cause immunosuppression. Patient understands the risks. All questions were answered. After discussion of options, patient requested an injection. Information regarding the injection was given to the patient. Which medications to stop prior to the injection was explained to the patient as well.\par \par Follow up in 1-2 weeks post injection for re-evaluation. \par Continue Home exercises, stretching, activity modification, physical therapy, and conservative care\par \par Patient presents with axial lumbar pain that has not responded to  3 months of conservative therapy including physical therapy or NSAID therapy.  The pain is interfering with activities of daily living and functionality.   There is no radicular pain.   The pain is exacerbated by facet loading.  Positive Kemps maneuver which is defined by pain reproduction with extension and rotation of the lumbar spine to the affected side.  The patient has not had a vertebral fusion at the levels of the proposed treatment.  There is no unexplained neurologic deficit.  There is no history of systemic infection, unstable medical condition, bleeding tendency, or local infection.  The injection is being performed to diagnose the facet joint as the source of the individual's pain. \par \par b/l lumbar MBB vs RFA depending on the degree and length of relief. \par  .\par \par

## 2022-08-01 ENCOUNTER — APPOINTMENT (OUTPATIENT)
Dept: ORTHOPEDIC SURGERY | Facility: CLINIC | Age: 57
End: 2022-08-01

## 2022-08-01 VITALS — BODY MASS INDEX: 27.83 KG/M2 | WEIGHT: 163 LBS | HEIGHT: 64 IN

## 2022-08-01 PROCEDURE — 99214 OFFICE O/P EST MOD 30 MIN: CPT

## 2022-08-01 NOTE — ASSESSMENT
[FreeTextEntry1] : We reviewed her course.\par More PT is needed.\par She does have more stiffness.\par We could consider an injection.

## 2022-08-01 NOTE — HISTORY OF PRESENT ILLNESS
[4] : 4 [2] : 2 [de-identified] : pt is here today for a follow up of her right shoulder. pt states she is improving but there is still certain things she is unable to do [FreeTextEntry1] : right shoulder  [de-identified] : home exercises and physical therapy
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
## 2022-08-01 NOTE — PHYSICAL EXAM
[Right] : right shoulder [Sitting] : sitting [Mild] : mild [4 ___] : forward flexion 4[unfilled]/5 [5___] : external rotation 5[unfilled]/5 [] : no sensory deficits [de-identified] : Strength was not stressed. [de-identified] : +arc [TWNoteComboBox7] : active forward flexion 150 degrees [TWNoteComboBox4] : passive forward flexion 160 degrees [TWNoteComboBox6] : internal rotation L1 [de-identified] : external rotation 50 degrees

## 2022-09-12 ENCOUNTER — APPOINTMENT (OUTPATIENT)
Dept: ORTHOPEDIC SURGERY | Facility: CLINIC | Age: 57
End: 2022-09-12

## 2022-09-12 VITALS — WEIGHT: 159 LBS | HEIGHT: 64 IN | BODY MASS INDEX: 27.14 KG/M2

## 2022-09-12 DIAGNOSIS — M25.511 PAIN IN RIGHT SHOULDER: ICD-10-CM

## 2022-09-12 DIAGNOSIS — M75.41 IMPINGEMENT SYNDROME OF RIGHT SHOULDER: ICD-10-CM

## 2022-09-12 DIAGNOSIS — M75.111 INCOMPLETE ROTATOR CUFF TEAR OR RUPTURE OF RIGHT SHOULDER, NOT SPECIFIED AS TRAUMATIC: ICD-10-CM

## 2022-09-12 PROCEDURE — 99214 OFFICE O/P EST MOD 30 MIN: CPT | Mod: 25

## 2022-09-12 PROCEDURE — 20611 DRAIN/INJ JOINT/BURSA W/US: CPT

## 2022-09-12 RX ORDER — ASPIRIN 81 MG
TABLET,CHEWABLE ORAL
Refills: 0 | Status: ACTIVE | COMMUNITY

## 2022-09-12 NOTE — HISTORY OF PRESENT ILLNESS
[3] : 3 [0] : 0 [de-identified] : pt is here today for a follow up for her right shoulder. pt states her pain has improved since last visit  [FreeTextEntry1] : right shoulder  [de-identified] : physical therapy and home exercises
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
## 2022-09-12 NOTE — PHYSICAL EXAM
[Right] : right shoulder [Sitting] : sitting [Mild] : mild [5___] : external rotation 5[unfilled]/5 [5 ___] : forward flexion 5[unfilled]/5 [] : no sensory deficits [de-identified] : +arc [TWNoteComboBox7] : active forward flexion 150 degrees [TWNoteComboBox4] : passive forward flexion 165 degrees [TWNoteComboBox6] : internal rotation L1 [de-identified] : external rotation 60 degrees

## 2022-09-12 NOTE — ASSESSMENT
[FreeTextEntry1] : There is less stiffness, though some does persist.\par There is associated pain. \par She will finish PT and do HEP\par A R SA/GH injection is advised.\par Questions answered. \par \par Patient seen by Lupillo Sanchez M.D. \par Entered by Yadira aDiley acting as scribe. \par \par \par Procedure Name: Large Joint Injection / Aspiration: Depomedrol, Lidocaine and Guidance Ultrasound\par \par Large Joint Injection was performed because of pain and inflammation.\par Depomedrol: An injection of Depomedrol 40 mg , 2 cc.\par Lidocaine: An injection of Lidocaine 1 mg , 13 cc.\par \par Medication was injected in the right subacromial space and glenohumeral joint . Patient has tried OTC's including aspirin, Ibuprofen, Aleve etc or prescription NSAIDS, and/or exercises at home and/ or physical therapy without satisfactory response. After verbal consent using sterile preparation and technique. The risks, benefits, and alternatives to cortisone injection were explained in full to the patient. Risks outlined include but are not limited to infection, sepsis, bleeding, scarring, skin discoloration, temporary increase in pain, syncopal episode, failure to resolve symptoms, allergic reaction, symptom recurrence, and elevation of blood sugar in diabetics. Patient understood the risks. All questions were answered. After discussion of options, patient requested an injection. Oral informed consent was obtained and sterile prep was done of the injection site. Sterile technique was utilized for the procedure including the preparation of the solutions used for the injection. Patient tolerated the procedure well. Advised to ice the injection site this evening. Prep with betadine locally to site. Sterile technique used. Patient tolerated procedure well. Post Procedure Instructions: Patient was advised to call if redness, pain, or fever occur and apply ice for 15 min. out of every hour for the next 12-24 hours as tolerated. Patient was advised to rest the joint(s) for 3 days. Ultrasound Guidance was used for the following reasons: for precise injection in area of tear. Visualization of the needle and placement of injection was performed without complication.

## 2022-10-19 ENCOUNTER — APPOINTMENT (OUTPATIENT)
Dept: PAIN MANAGEMENT | Facility: CLINIC | Age: 57
End: 2022-10-19

## 2022-10-19 VITALS — BODY MASS INDEX: 28.17 KG/M2 | HEIGHT: 64 IN | WEIGHT: 165 LBS

## 2022-10-19 PROCEDURE — 99214 OFFICE O/P EST MOD 30 MIN: CPT

## 2022-10-19 NOTE — HISTORY OF PRESENT ILLNESS
[Neck] : neck [Lower back] : lower back [Dull/Aching] : dull/aching [Constant] : constant [Sleep] : sleep [Meds] : meds [Injection therapy] : injection therapy [Sitting] : sitting [Bending forward] : bending forward [8] : 8 [6] : 6 [Burning] : burning [Tightness] : tightness [FreeTextEntry1] : 10/19/2022: follow up today.Pain is returning in low back.  Will schedule RFA.  Is on a baby aspirin.\par \par 07/25/2022: follow up today after b/l lumbar MBB on 7/11/22. Pt with near complete resolution of her pain following. last block was over a year ago with relief. \par \par 06/27/2022: follow up today.  Had 60% relief from NICKIE.  Will start PT.  Will schedule lumbar MBB #2.\par \par 4/20/22- Patient here for follow up.  Had shoulder surgery March 1 with Dr. Sanchez.  Now has been having pain in neck and radiating to both hands.  Is on xarelto for a.fib.\par \par 5/7/21: follow up today after b/l lumbar MBB on 4/22/21. she had an overall 80-90% improvement which is sustained. has some mild pain on the left lower back and left buttock.\par \par 4/16/21: follow up today after LESI L5/S1 on 3/3/21. Pt reports an overall 30%-50% improvement. Pain in the left lower back. Radicular pain much improved. will f/u with Dr. Grimm as her EMG did reveal Cervical radiculopathy, CTS and Cubital tunnel syndrome. She is waiting to get MRI of the left shoulder.\par \par \par 2/17/21- Patient had 70% relief from NICKIE. Pain has returned in lower back. Will repeat LESI.\par Patient had 85% improvement from LESI. Had 60% relief from NICKIE. Still has some discomfort in neck.\par \par Injections- 6/6/22 NICKIE\par b/l lumbar MBB (4/22/21, 7/11/22) [] : no [FreeTextEntry7] : b/l legs

## 2022-10-19 NOTE — ASSESSMENT
[FreeTextEntry1] : After discussing various treatment options with the patient including but not limited to oral medications, physical therapy, exercise, modalities as well as interventional spinal injections, we have decided with the following plan:\par \par 1) Intervention Injection Therapy:\par I personally reviewed the MRI/CT scan images and agree with the radiologist's report. The radiological findings were discussed with the patient.\par The risks, benefits, contents and alternatives to injection were explained in full to the patient. Risks outlined include but are not limited to infection,sepsis, bleeding, post-dural puncture headache, nerve damage, temporary increase in pain, syncopal episode, failure to resolve symptoms, allergic reaction, symptom recurrence, and elevation of blood sugar in diabetics. Cortisone may cause immunosuppression. Patient understands the risks. All questions were answered. After discussion of options, patient requested an injection. Information regarding the injection was given to the patient. Which medications to stop prior to the injection was explained to the patient as well.\par \par Follow up in 1-2 weeks post injection for re-evaluation. \par Continue Home exercises, stretching, activity modification, physical therapy, and conservative care\par \par Patient presents with axial lumbar pain that has not responded to  3 months of conservative therapy including physical therapy or NSAID therapy.  The pain is interfering with activities of daily living and functionality.   There is no radicular pain.   The pain is exacerbated by facet loading.  Positive Kemps maneuver which is defined by pain reproduction with extension and rotation of the lumbar spine to the affected side.  The patient has not had a vertebral fusion at the levels of the proposed treatment.  There is no unexplained neurologic deficit.  There is no history of systemic infection, unstable medical condition, bleeding tendency, or local infection.  The injection is being performed to diagnose the facet joint as the source of the individual's pain. \par \par b/l lumbar RFA \par  .\par \par

## 2022-11-21 ENCOUNTER — APPOINTMENT (OUTPATIENT)
Dept: PAIN MANAGEMENT | Facility: CLINIC | Age: 57
End: 2022-11-21

## 2022-11-21 PROCEDURE — 64636Z: CUSTOM

## 2022-11-21 PROCEDURE — 64635 DESTROY LUMB/SAC FACET JNT: CPT | Mod: 50

## 2022-11-21 NOTE — PROCEDURE
[FreeTextEntry3] : Date of Service: 11/21/2022 \par \par Account: [id number]\par \par Patient: NICKOLAS TORRES \par \par YOB: 1965\par \par Age: 56 year\par \par \par PREOPERATIVE DIAGNOSIS: Spondylosis of Lumbar Region without Myelopathy or Radiculopathy (M47.816)\par \par     1. \par \par         POSTOPERATIVE DIAGNOSIS: Spondylosis of Lumbar Region without Myelopathy or Radiculopathy (M47.816)\par \par     1. \par \par         PROCEDURE:\par \par         1) Right L3, L4, L5 and Left L3, L4, L5 medial branch radiofrequency ablation under fluoroscopic guidance.                        \par \par Anesthesia:                                                      MAC\par \par \par Risks, benefits and alternatives of the procedure were discussed with the patient after which they agreed to proceed.  Patient was brought into fluoroscopy suite and was placed in prone position with hip support. Back was prepped and draped in a sterile fashion x3. Anesthesia initiated.\par \par Under AP visualization, the right and left sacral ala was identified and marked. Using a 25 gauge ½ inch needle the skin and subcutaneous structures at this point were localized with 1% Lidocaine using approximately 3 cc's 1% Lidocaine.  After this, a 20 gauge 100mm Ramin radiofrequency needle with a 10mm curved tip was inserted and using depth direction depth technique under constant fluoroscopic visualization, the needle was advanced to the sacral ala until os was contacted. \par \par The camera was then redirected under AP view to visualize the right and left L4 and L5 vertebra. The camera was obliqued to approximately 30 degrees to reveal good Hernesto dog anatomical view. The junction of the superior articulate process and transverse process at the L4 and L5 levels  were then identified and marked. Skin and subcutaneous structures were then anesthetized with approximately 3 cc's of 1% Lidocaine at each of these levels. After which a 20 gauge 100mm Ramin radiofrequency needle with a 10mm curved tip then advanced until the junction at the SAP and transverse process was met.  The camera was then directed through the lateral view and under constant fluoroscopic visualization, the needle tips were then advanced and confirmed at the junction of the SAP and transverse process. \par \par The stylette for the most cephalad needle at the L4 level was then removed and a [place] radiofrequency probe was then placed inside the needle. After their pedis' were checked at approximately 300 ohm, 50 hertz sensory stimulation was performed.  Patient experienced concordant pain in their low back at approximately 0.3 volts. The voltage was then increased to 1 volt. The patient reported increased low back pain symptoms without any radiation below the knee.  Stimulation was then changed to 2 hertz and increased slowly by .1 volt increments at approximately 0.5 volts, patient began to experience thumping like reproductive pain in their low back. The voltage was then increased to approximately 2.5 volts. Patient experienced increasing thumping without any sensation below their knee. This exact stimulation was then repeated for the L5 needle as well as sacral ala needle with concordant pain and no radiation below the knee. The 6 levels were then anesthetized with approximately 0.5 cc's of 1% Lidocaine. After which each area was then ablated at 80 degrees centigrade for 90 seconds each. Patient felt no pain reproduction during the ablation procedure.  After each of these levels were ablated and injected approximately 1 cc of 0.25% Bupivacaine plus 80 mg of Kenalog were then injected before the needles were removed.  Pressure was then applied to the low back. Band-aids were applied.  Patient was brought to the recovery, ambulated on their own after the procedure and reported decreased low back pain.\par \par Anesthesia personnel were present throughout the procedure. Patient was told to apply ice to the low back for 20 minutes on and 20 minutes off for focal symptoms for 24-48 hours. They should call the office if they have any questions or concerns. \par \par Laly Ngo M.D.\par

## 2022-12-05 ENCOUNTER — APPOINTMENT (OUTPATIENT)
Dept: PAIN MANAGEMENT | Facility: CLINIC | Age: 57
End: 2022-12-05

## 2022-12-05 VITALS — BODY MASS INDEX: 28.17 KG/M2 | HEIGHT: 64 IN | WEIGHT: 165 LBS

## 2022-12-05 PROCEDURE — 99214 OFFICE O/P EST MOD 30 MIN: CPT

## 2022-12-05 NOTE — DATA REVIEWED
[MRI] : MRI [Cervical Spine] : cervical spine [Lumbar Spine] : lumbar spine [Report was reviewed and noted in the chart] : The report was reviewed and noted in the chart

## 2022-12-05 NOTE — HISTORY OF PRESENT ILLNESS
[Neck] : neck [Lower back] : lower back [Dull/Aching] : dull/aching [Tightness] : tightness [Sleep] : sleep [Meds] : meds [Injection therapy] : injection therapy [Sitting] : sitting [Bending forward] : bending forward [5] : 5 [3] : 3 [Tingling] : tingling [Intermittent] : intermittent [Rest] : rest [FreeTextEntry1] : 12/05/22: follow up today after Right lumbar RFA  L3,L4,L5 on 11/21/22. She gets intermittent numbness down the leg. Pain down the lateral left leg. Sometimes during the night she gets numbness in the right buttock. \par \par 10/19/2022: follow up today.Pain is returning in low back.  Will schedule RFA.  Is on a baby aspirin.\par \par 07/25/2022: follow up today after b/l lumbar MBB on 7/11/22. Pt with near complete resolution of her pain following. last block was over a year ago with relief. \par \par 06/27/2022: follow up today.  Had 60% relief from NICKIE.  Will start PT.  Will schedule lumbar MBB #2.\par \par 4/20/22- Patient here for follow up.  Had shoulder surgery March 1 with Dr. Sanchez.  Now has been having pain in neck and radiating to both hands.  Is on xarelto for a.fib.\par \par 5/7/21: follow up today after b/l lumbar MBB on 4/22/21. she had an overall 80-90% improvement which is sustained. has some mild pain on the left lower back and left buttock.\par \par 4/16/21: follow up today after LESI L5/S1 on 3/3/21. Pt reports an overall 30%-50% improvement. Pain in the left lower back. Radicular pain much improved. will f/u with Dr. Grimm as her EMG did reveal Cervical radiculopathy, CTS and Cubital tunnel syndrome. She is waiting to get MRI of the left shoulder.\par \par \par 2/17/21- Patient had 70% relief from NICKIE. Pain has returned in lower back. Will repeat LESI.\par Patient had 85% improvement from LESI. Had 60% relief from NICKIE. Still has some discomfort in neck.\par \par Injections- 6/6/22 NICKIE\par b/l lumbar MBB (4/22/21, 7/11/22)\par 11/21/22- B/L lumbar RFA [] : no [FreeTextEntry6] : numbness  [FreeTextEntry7] : b/l legs. more to the left leg to the calf

## 2022-12-05 NOTE — PHYSICAL EXAM
[Flexion] : flexion [Rotation to left] : rotation to left [Rotation to right] : rotation to right [4___] : right triceps 4[unfilled]/5 [Extension] : extension [] : no palpable masses [TWNoteComboBox7] : forward flexion 75 degrees [de-identified] : extension 20 degrees

## 2022-12-05 NOTE — ASSESSMENT
[FreeTextEntry1] : After discussing various treatment options with the patient including but not limited to oral medications, physical therapy, exercise, modalities as well as interventional spinal injections, we have decided with the following plan:\par \par 1) Intervention Injection Therapy:\par I personally reviewed the MRI/CT scan images and agree with the radiologist's report. The radiological findings were discussed with the patient.\par The risks, benefits, contents and alternatives to injection were explained in full to the patient. Risks outlined include but are not limited to infection,sepsis, bleeding, post-dural puncture headache, nerve damage, temporary increase in pain, syncopal episode, failure to resolve symptoms, allergic reaction, symptom recurrence, and elevation of blood sugar in diabetics. Cortisone may cause immunosuppression. Patient understands the risks. All questions were answered. After discussion of options, patient requested an injection. Information regarding the injection was given to the patient. Which medications to stop prior to the injection was explained to the patient as well.\par \par Follow up in 1-2 weeks post injection for re-evaluation. \par Continue Home exercises, stretching, activity modification, physical therapy, and conservative care\par \par Patient presents with axial lumbar pain that has not responded to  3 months of conservative therapy including physical therapy or NSAID therapy.  The pain is interfering with activities of daily living and functionality.   There is no radicular pain.   The pain is exacerbated by facet loading.  Positive Kemps maneuver which is defined by pain reproduction with extension and rotation of the lumbar spine to the affected side.  The patient has not had a vertebral fusion at the levels of the proposed treatment.  There is no unexplained neurologic deficit.  There is no history of systemic infection, unstable medical condition, bleeding tendency, or local infection.  The injection is being performed to diagnose the facet joint as the source of the individual's pain. \par \par b/l lumbar RFA --will call \par LESI L5/s1 will call\par  .\par \par

## 2022-12-12 ENCOUNTER — APPOINTMENT (OUTPATIENT)
Dept: ORTHOPEDIC SURGERY | Facility: CLINIC | Age: 57
End: 2022-12-12

## 2023-02-16 ENCOUNTER — OUTPATIENT (OUTPATIENT)
Dept: OUTPATIENT SERVICES | Facility: HOSPITAL | Age: 58
LOS: 1 days | End: 2023-02-16
Payer: COMMERCIAL

## 2023-02-16 VITALS
SYSTOLIC BLOOD PRESSURE: 115 MMHG | OXYGEN SATURATION: 98 % | TEMPERATURE: 98 F | DIASTOLIC BLOOD PRESSURE: 78 MMHG | RESPIRATION RATE: 14 BRPM | WEIGHT: 166.67 LBS | HEART RATE: 81 BPM | HEIGHT: 64 IN

## 2023-02-16 DIAGNOSIS — M77.31 CALCANEAL SPUR, RIGHT FOOT: ICD-10-CM

## 2023-02-16 DIAGNOSIS — Z98.89 OTHER SPECIFIED POSTPROCEDURAL STATES: Chronic | ICD-10-CM

## 2023-02-16 DIAGNOSIS — Z01.818 ENCOUNTER FOR OTHER PREPROCEDURAL EXAMINATION: ICD-10-CM

## 2023-02-16 DIAGNOSIS — D16.31 BENIGN NEOPLASM OF SHORT BONES OF RIGHT LOWER LIMB: ICD-10-CM

## 2023-02-16 DIAGNOSIS — Z98.84 BARIATRIC SURGERY STATUS: Chronic | ICD-10-CM

## 2023-02-16 DIAGNOSIS — Z98.890 OTHER SPECIFIED POSTPROCEDURAL STATES: Chronic | ICD-10-CM

## 2023-02-16 DIAGNOSIS — Z90.49 ACQUIRED ABSENCE OF OTHER SPECIFIED PARTS OF DIGESTIVE TRACT: Chronic | ICD-10-CM

## 2023-02-16 DIAGNOSIS — Z90.3 ACQUIRED ABSENCE OF STOMACH [PART OF]: Chronic | ICD-10-CM

## 2023-02-16 DIAGNOSIS — M85.671 OTHER CYST OF BONE, RIGHT ANKLE AND FOOT: ICD-10-CM

## 2023-02-16 DIAGNOSIS — Z90.710 ACQUIRED ABSENCE OF BOTH CERVIX AND UTERUS: Chronic | ICD-10-CM

## 2023-02-16 DIAGNOSIS — Z98.51 TUBAL LIGATION STATUS: Chronic | ICD-10-CM

## 2023-02-16 PROCEDURE — G0463: CPT

## 2023-02-16 RX ORDER — RIVAROXABAN 15 MG-20MG
0 KIT ORAL
Qty: 0 | Refills: 0 | DISCHARGE

## 2023-02-16 RX ORDER — PREGABALIN 225 MG/1
0 CAPSULE ORAL
Qty: 0 | Refills: 0 | DISCHARGE

## 2023-02-16 RX ORDER — UBIDECARENONE 100 MG
0 CAPSULE ORAL
Qty: 0 | Refills: 0 | DISCHARGE

## 2023-02-16 RX ORDER — CHOLECALCIFEROL (VITAMIN D3) 125 MCG
0 CAPSULE ORAL
Qty: 0 | Refills: 0 | DISCHARGE

## 2023-02-16 NOTE — H&P PST ADULT - MUSCULOSKELETAL
right shoulder/no joint swelling/no joint erythema/no joint warmth/no calf tenderness/decreased ROM/decreased ROM due to pain/strength 5/5 bilateral lower extremities/back exam details…

## 2023-02-16 NOTE — H&P PST ADULT - PROBLEM SELECTOR PLAN 1
excision of retrocalcaneal spur right foot and haglands deformity. medical and cardiac clearances requested. obtain stress, echo, labs and EKG. surgical wash instructions reviewed and verbalized understanding. instructed to stop echinacea 1 week prior to surgery and check with cardiology about holding aspirin. instructed to take metoprolol AM of surgery with sips of water. covid PCR appt. confirmed for 2/21/23 in [place]

## 2023-02-16 NOTE — H&P PST ADULT - NSICDXPASTMEDICALHX_GEN_ALL_CORE_FT
PAST MEDICAL HISTORY:  2019 novel coronavirus disease (COVID-19) 12/3/2021    Asthma triggered by bronchitis; last episode 2013    Calcaneal spur, right foot     COVID-19 vaccine series completed 12/3/21 + , has tested negative since then she states.    Herniated cervical disc     Herpes simplex type 2 infection     History of atrial fibrillation     History of breast cancer 2006    Hypertension     Implantable loop recorder present     Kidney stone     Low folic acid     Lumbar herniated disc     Obesity (BMI 30-39.9)     Osteoarthritis     Sleep apnea does not use CPAP    SVT (supraventricular tachycardia)     Thyromegaly

## 2023-02-16 NOTE — H&P PST ADULT - HISTORY OF PRESENT ILLNESS
56 yo female presents after an injury to the right foot in 2012. She did PT after the injury and several cortisone injections with good relief. She states that in November 2022 she developed pain in the foot again and went to see Dr. Goncalves. She was found to have a calcaneal spur. Pain now 3/10 at rest and increased 9/10 with activity. Denies using any prior or current treatment since November.

## 2023-02-16 NOTE — H&P PST ADULT - NSICDXPASTSURGICALHX_GEN_ALL_CORE_FT
PAST SURGICAL HISTORY:  H/O gastric sleeve 5/2018    History of cardiac radiofrequency ablation     History of removal of laparoscopic gastric banding device     History of repair of right rotator cuff     LAP-BAND surgery status 05/2005    S/P breast reconstruction, bilateral 2014    S/P cholecystectomy 12/1998    S/P hernia repair 2/2017    S/P hysterectomy 2014    S/P lumpectomy, right breast 11/2006 ( Radiation x 6 weeks )    S/P tubal ligation 1998     PAST SURGICAL HISTORY:  H/O gastric sleeve 5/2018    History of cardiac radiofrequency ablation     History of lithotripsy     History of removal of laparoscopic gastric banding device     History of repair of right rotator cuff     LAP-BAND surgery status 05/2005    S/P breast reconstruction, bilateral 2014    S/P cholecystectomy 12/1998    S/P hernia repair 2/2017    S/P hysterectomy 2014    S/P lumpectomy, right breast 11/2006 ( Radiation x 6 weeks )    S/P tubal ligation 1998

## 2023-02-16 NOTE — H&P PST ADULT - RESPIRATORY
clear to auscultation bilaterally/no wheezes/no rales/no respiratory distress/airway patent/breath sounds equal/good air movement/respirations non-labored

## 2023-02-16 NOTE — H&P PST ADULT - COMMENTS
history of covid November 2021, mild symptoms and no treatment  lost 78 pounds after bariatric surgery and states that she no longer has CPAP  asthma triggered with brochitis and no episodes in many years history of covid November 2021, mild symptoms and no treatment  lost 78 pounds after bariatric surgery and states that she no longer has CPAP  asthma triggered with bronchitis and no episodes in many years

## 2023-02-16 NOTE — H&P PST ADULT - NSICDXFAMILYHX_GEN_ALL_CORE_FT
FAMILY HISTORY:  Father  Still living? Unknown  FH: hypertension, Age at diagnosis: Age Unknown    Mother  Still living? Unknown  FH: type 2 diabetes, Age at diagnosis: Age Unknown    Grandparent  Still living? No  Family history of skin cancer, Age at diagnosis: Age Unknown  FH: type 2 diabetes, Age at diagnosis: Age Unknown    Aunt  Still living? Unknown  FH: type 2 diabetes, Age at diagnosis: Age Unknown

## 2023-02-16 NOTE — H&P PST ADULT - NSANTHOSAYNRD_GEN_A_CORE
neck 14 inches, lost 78 pounds and states that she no longer has sleep apnea/No. IRMA screening performed.  STOP BANG Legend: 0-2 = LOW Risk; 3-4 = INTERMEDIATE Risk; 5-8 = HIGH Risk

## 2023-02-22 LAB — SARS-COV-2 N GENE NPH QL NAA+PROBE: NOT DETECTED

## 2023-02-23 ENCOUNTER — TRANSCRIPTION ENCOUNTER (OUTPATIENT)
Age: 58
End: 2023-02-23

## 2023-02-24 ENCOUNTER — TRANSCRIPTION ENCOUNTER (OUTPATIENT)
Age: 58
End: 2023-02-24

## 2023-02-24 ENCOUNTER — OUTPATIENT (OUTPATIENT)
Dept: OUTPATIENT SERVICES | Facility: HOSPITAL | Age: 58
LOS: 1 days | End: 2023-02-24
Payer: COMMERCIAL

## 2023-02-24 VITALS
OXYGEN SATURATION: 99 % | SYSTOLIC BLOOD PRESSURE: 107 MMHG | HEIGHT: 64 IN | TEMPERATURE: 97 F | WEIGHT: 166.67 LBS | RESPIRATION RATE: 14 BRPM | DIASTOLIC BLOOD PRESSURE: 72 MMHG | HEART RATE: 75 BPM

## 2023-02-24 VITALS
RESPIRATION RATE: 16 BRPM | HEART RATE: 84 BPM | OXYGEN SATURATION: 100 % | SYSTOLIC BLOOD PRESSURE: 104 MMHG | TEMPERATURE: 98 F | DIASTOLIC BLOOD PRESSURE: 69 MMHG

## 2023-02-24 DIAGNOSIS — Z90.49 ACQUIRED ABSENCE OF OTHER SPECIFIED PARTS OF DIGESTIVE TRACT: Chronic | ICD-10-CM

## 2023-02-24 DIAGNOSIS — Z98.890 OTHER SPECIFIED POSTPROCEDURAL STATES: Chronic | ICD-10-CM

## 2023-02-24 DIAGNOSIS — Z98.89 OTHER SPECIFIED POSTPROCEDURAL STATES: Chronic | ICD-10-CM

## 2023-02-24 DIAGNOSIS — Z98.51 TUBAL LIGATION STATUS: Chronic | ICD-10-CM

## 2023-02-24 DIAGNOSIS — Z90.710 ACQUIRED ABSENCE OF BOTH CERVIX AND UTERUS: Chronic | ICD-10-CM

## 2023-02-24 DIAGNOSIS — Z90.3 ACQUIRED ABSENCE OF STOMACH [PART OF]: Chronic | ICD-10-CM

## 2023-02-24 DIAGNOSIS — M85.671 OTHER CYST OF BONE, RIGHT ANKLE AND FOOT: ICD-10-CM

## 2023-02-24 DIAGNOSIS — Z98.84 BARIATRIC SURGERY STATUS: Chronic | ICD-10-CM

## 2023-02-24 DIAGNOSIS — M77.31 CALCANEAL SPUR, RIGHT FOOT: ICD-10-CM

## 2023-02-24 DIAGNOSIS — D16.31 BENIGN NEOPLASM OF SHORT BONES OF RIGHT LOWER LIMB: ICD-10-CM

## 2023-02-24 PROCEDURE — 88311 DECALCIFY TISSUE: CPT | Mod: 26

## 2023-02-24 PROCEDURE — 76000 FLUOROSCOPY <1 HR PHYS/QHP: CPT

## 2023-02-24 PROCEDURE — 73620 X-RAY EXAM OF FOOT: CPT | Mod: 26,RT

## 2023-02-24 PROCEDURE — 88304 TISSUE EXAM BY PATHOLOGIST: CPT

## 2023-02-24 PROCEDURE — C9399: CPT

## 2023-02-24 PROCEDURE — C1713: CPT

## 2023-02-24 PROCEDURE — 88304 TISSUE EXAM BY PATHOLOGIST: CPT | Mod: 26

## 2023-02-24 PROCEDURE — 28119 REMOVAL OF HEEL SPUR: CPT | Mod: RT

## 2023-02-24 PROCEDURE — 73620 X-RAY EXAM OF FOOT: CPT

## 2023-02-24 PROCEDURE — 88311 DECALCIFY TISSUE: CPT

## 2023-02-24 DEVICE — QUICKANCOR  PLUS  ORTHOCORD GII SZ 2: Type: IMPLANTABLE DEVICE | Site: RIGHT | Status: FUNCTIONAL

## 2023-02-24 RX ORDER — SODIUM CHLORIDE 9 MG/ML
1000 INJECTION, SOLUTION INTRAVENOUS
Refills: 0 | Status: DISCONTINUED | OUTPATIENT
Start: 2023-02-24 | End: 2023-02-24

## 2023-02-24 RX ORDER — HYDROMORPHONE HYDROCHLORIDE 2 MG/ML
1 INJECTION INTRAMUSCULAR; INTRAVENOUS; SUBCUTANEOUS
Refills: 0 | Status: DISCONTINUED | OUTPATIENT
Start: 2023-02-24 | End: 2023-02-24

## 2023-02-24 RX ORDER — ONDANSETRON 8 MG/1
4 TABLET, FILM COATED ORAL ONCE
Refills: 0 | Status: DISCONTINUED | OUTPATIENT
Start: 2023-02-24 | End: 2023-02-24

## 2023-02-24 RX ORDER — HYDROMORPHONE HYDROCHLORIDE 2 MG/ML
0.5 INJECTION INTRAMUSCULAR; INTRAVENOUS; SUBCUTANEOUS
Refills: 0 | Status: DISCONTINUED | OUTPATIENT
Start: 2023-02-24 | End: 2023-02-24

## 2023-02-24 RX ADMIN — HYDROMORPHONE HYDROCHLORIDE 0.5 MILLIGRAM(S): 2 INJECTION INTRAMUSCULAR; INTRAVENOUS; SUBCUTANEOUS at 12:50

## 2023-02-24 RX ADMIN — HYDROMORPHONE HYDROCHLORIDE 0.5 MILLIGRAM(S): 2 INJECTION INTRAMUSCULAR; INTRAVENOUS; SUBCUTANEOUS at 12:37
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
## 2023-02-24 NOTE — ASU PATIENT PROFILE, ADULT - NSICDXPASTSURGICALHX_GEN_ALL_CORE_FT
PAST SURGICAL HISTORY:  H/O gastric sleeve 5/2018    History of cardiac radiofrequency ablation     History of lithotripsy     History of removal of laparoscopic gastric banding device     History of repair of right rotator cuff     LAP-BAND surgery status 05/2005    S/P breast reconstruction, bilateral 2014    S/P cholecystectomy 12/1998    S/P hernia repair 2/2017    S/P hysterectomy 2014    S/P lumpectomy, right breast 11/2006 ( Radiation x 6 weeks )    S/P tubal ligation 1998

## 2023-02-24 NOTE — ASU PATIENT PROFILE, ADULT - AS SC BRADEN SENSORY
(4) no impairment
f/u weight feeds color check in 2 days   if any issues to see us tomorrow, otherwise tuesday at latest

## 2023-02-24 NOTE — BRIEF OPERATIVE NOTE - NSICDXBRIEFPREOP_GEN_ALL_CORE_FT
PRE-OP DIAGNOSIS:  Colby's deformity, right 24-Feb-2023 12:16:28  Nestor Goncalves  Heel spur, right 24-Feb-2023 12:17:14  Nestor Goncalves

## 2023-02-24 NOTE — BRIEF OPERATIVE NOTE - NSICDXBRIEFPROCEDURE_GEN_ALL_CORE_FT
PROCEDURES:  Excision of bone spur of right calcaneus 24-Feb-2023 12:13:45  Nestor Goncalves  Excision of Colby's deformity of right heel 24-Feb-2023 12:15:15  Nestor Goncalves

## 2023-02-24 NOTE — ASU DISCHARGE PLAN (ADULT/PEDIATRIC) - CONDITION AT DISCHARGE
Duration Of Freeze Thaw-Cycle (Seconds): 0
Post-Care Instructions: I reviewed with the patient in detail post-care instructions. Patient is to wear sunprotection, and avoid picking at any of the treated lesions. Pt may apply Vaseline to crusted or scabbing areas.
Detail Level: Simple
Render Post-Care Instructions In Note?: no
Consent: The patient's consent was obtained including but not limited to risks of crusting, scabbing, blistering, scarring, darker or lighter pigmentary change, recurrence, incomplete removal and infection.
Stable

## 2023-02-24 NOTE — BRIEF OPERATIVE NOTE - NSICDXBRIEFPOSTOP_GEN_ALL_CORE_FT
POST-OP DIAGNOSIS:  Colby's deformity, right 24-Feb-2023 12:17:30  Nestor Goncalves  Heel spur, right 24-Feb-2023 12:17:39  Nestor Goncalves

## 2023-02-24 NOTE — ASU DISCHARGE PLAN (ADULT/PEDIATRIC) - NS MD DC FALL RISK RISK
For information on Fall & Injury Prevention, visit: [email or web address] OR  [email or web address] OR  https://www.cdc.gov/steadi/patient.html

## 2023-03-01 LAB — SURGICAL PATHOLOGY STUDY: SIGNIFICANT CHANGE UP

## 2023-03-08 PROBLEM — E53.8 DEFICIENCY OF OTHER SPECIFIED B GROUP VITAMINS: Chronic | Status: ACTIVE | Noted: 2023-02-16

## 2023-03-08 PROBLEM — M77.31 CALCANEAL SPUR, RIGHT FOOT: Chronic | Status: ACTIVE | Noted: 2023-02-16

## 2023-03-08 PROBLEM — E01.0 IODINE-DEFICIENCY RELATED DIFFUSE (ENDEMIC) GOITER: Chronic | Status: ACTIVE | Noted: 2023-02-16

## 2023-03-08 PROBLEM — Z95.818 PRESENCE OF OTHER CARDIAC IMPLANTS AND GRAFTS: Chronic | Status: ACTIVE | Noted: 2023-02-16

## 2023-03-09 ENCOUNTER — TRANSCRIPTION ENCOUNTER (OUTPATIENT)
Age: 58
End: 2023-03-09

## 2023-03-10 ENCOUNTER — OUTPATIENT (OUTPATIENT)
Dept: OUTPATIENT SERVICES | Facility: HOSPITAL | Age: 58
LOS: 1 days | End: 2023-03-10
Payer: COMMERCIAL

## 2023-03-10 ENCOUNTER — TRANSCRIPTION ENCOUNTER (OUTPATIENT)
Age: 58
End: 2023-03-10

## 2023-03-10 VITALS
SYSTOLIC BLOOD PRESSURE: 129 MMHG | HEART RATE: 72 BPM | TEMPERATURE: 97 F | OXYGEN SATURATION: 99 % | DIASTOLIC BLOOD PRESSURE: 83 MMHG | RESPIRATION RATE: 16 BRPM

## 2023-03-10 VITALS
WEIGHT: 166.67 LBS | SYSTOLIC BLOOD PRESSURE: 113 MMHG | HEART RATE: 72 BPM | HEIGHT: 64 IN | OXYGEN SATURATION: 97 % | DIASTOLIC BLOOD PRESSURE: 75 MMHG | TEMPERATURE: 98 F | RESPIRATION RATE: 15 BRPM

## 2023-03-10 DIAGNOSIS — Z98.890 OTHER SPECIFIED POSTPROCEDURAL STATES: Chronic | ICD-10-CM

## 2023-03-10 DIAGNOSIS — Z90.49 ACQUIRED ABSENCE OF OTHER SPECIFIED PARTS OF DIGESTIVE TRACT: Chronic | ICD-10-CM

## 2023-03-10 DIAGNOSIS — M77.31 CALCANEAL SPUR, RIGHT FOOT: ICD-10-CM

## 2023-03-10 DIAGNOSIS — Z90.3 ACQUIRED ABSENCE OF STOMACH [PART OF]: Chronic | ICD-10-CM

## 2023-03-10 DIAGNOSIS — Z98.89 OTHER SPECIFIED POSTPROCEDURAL STATES: Chronic | ICD-10-CM

## 2023-03-10 DIAGNOSIS — Z98.84 BARIATRIC SURGERY STATUS: Chronic | ICD-10-CM

## 2023-03-10 DIAGNOSIS — Z98.51 TUBAL LIGATION STATUS: Chronic | ICD-10-CM

## 2023-03-10 DIAGNOSIS — Z90.710 ACQUIRED ABSENCE OF BOTH CERVIX AND UTERUS: Chronic | ICD-10-CM

## 2023-03-10 DIAGNOSIS — D16.31 BENIGN NEOPLASM OF SHORT BONES OF RIGHT LOWER LIMB: ICD-10-CM

## 2023-03-10 PROCEDURE — C1713: CPT

## 2023-03-10 PROCEDURE — 73620 X-RAY EXAM OF FOOT: CPT

## 2023-03-10 PROCEDURE — 76000 FLUOROSCOPY <1 HR PHYS/QHP: CPT

## 2023-03-10 PROCEDURE — 28119 REMOVAL OF HEEL SPUR: CPT | Mod: RT

## 2023-03-10 PROCEDURE — 73620 X-RAY EXAM OF FOOT: CPT | Mod: 26,RT

## 2023-03-10 PROCEDURE — C9399: CPT

## 2023-03-10 DEVICE — QUICKANCOR  PLUS  ORTHOCORD GII SZ 2: Type: IMPLANTABLE DEVICE | Site: RIGHT | Status: FUNCTIONAL

## 2023-03-10 RX ORDER — SODIUM CHLORIDE 9 MG/ML
1000 INJECTION, SOLUTION INTRAVENOUS
Refills: 0 | Status: DISCONTINUED | OUTPATIENT
Start: 2023-03-10 | End: 2023-03-10

## 2023-03-10 RX ORDER — HYDROMORPHONE HYDROCHLORIDE 2 MG/ML
0.5 INJECTION INTRAMUSCULAR; INTRAVENOUS; SUBCUTANEOUS
Refills: 0 | Status: DISCONTINUED | OUTPATIENT
Start: 2023-03-10 | End: 2023-03-10

## 2023-03-10 RX ORDER — ONDANSETRON 8 MG/1
4 TABLET, FILM COATED ORAL ONCE
Refills: 0 | Status: DISCONTINUED | OUTPATIENT
Start: 2023-03-10 | End: 2023-03-10

## 2023-03-10 NOTE — ASU DISCHARGE PLAN (ADULT/PEDIATRIC) - NS MD DC FALL RISK RISK
For information on Fall & Injury Prevention, visit: [email or web address] OR  [email or web address] OR  https://www.cdc.gov/steadi/patient.html

## 2023-03-10 NOTE — ASU PREOP CHECKLIST - PATIENT'S PERSONAL PROPERTY GIVEN TO
Alert-The patient is alert, awake and responds to voice. The patient is oriented to time, place, and person. The triage nurse is able to obtain subjective information. on unit

## 2023-03-10 NOTE — BRIEF OPERATIVE NOTE - NSICDXBRIEFPOSTOP_GEN_ALL_CORE_FT
POST-OP DIAGNOSIS:  Retrocalcaneal bone spur 10-Mar-2023 11:29:17  Nestor Goncalves  Colby's deformity, right 10-Mar-2023 11:29:29  Nestor Goncalves

## 2023-03-10 NOTE — BRIEF OPERATIVE NOTE - NSICDXBRIEFPREOP_GEN_ALL_CORE_FT
PRE-OP DIAGNOSIS:  Colby's deformity, right 10-Mar-2023 11:28:49  Nestor Goncalves  Retrocalcaneal bone spur 10-Mar-2023 11:29:04  Nestor Goncalves

## 2023-03-10 NOTE — BRIEF OPERATIVE NOTE - NSICDXBRIEFPROCEDURE_GEN_ALL_CORE_FT
PROCEDURES:  Open resection of spur of right heel 10-Mar-2023 11:28:23  Nestor Goncalves  Reduction of Colby's deformity 10-Mar-2023 11:28:38  Nestor Goncalves

## 2023-03-10 NOTE — ASU PATIENT PROFILE, ADULT - FALL HARM RISK - RISK INTERVENTIONS

## 2023-04-24 NOTE — ASU PATIENT PROFILE, ADULT - NS TRANSFER PATIENT BELONGINGS
General Surgery - H&P  Dr. Wendy Benson, PA-C      The patient was seen and examined. There have been no changes to the H&P since the patient was seen in the office on 4/10/23. We will proceed with left breast superficial cyst excision. No preop abx required for this procedure.        [employer]
Cell Phone/PDA (specify)/Clothing

## 2023-05-26 ENCOUNTER — APPOINTMENT (OUTPATIENT)
Dept: ORTHOPEDIC SURGERY | Facility: CLINIC | Age: 58
End: 2023-05-26
Payer: COMMERCIAL

## 2023-05-26 ENCOUNTER — RESULT REVIEW (OUTPATIENT)
Age: 58
End: 2023-05-26

## 2023-05-26 VITALS — HEIGHT: 64 IN | WEIGHT: 165 LBS | BODY MASS INDEX: 28.17 KG/M2

## 2023-05-26 DIAGNOSIS — M25.569 PAIN IN UNSPECIFIED KNEE: ICD-10-CM

## 2023-05-26 PROCEDURE — 99214 OFFICE O/P EST MOD 30 MIN: CPT

## 2023-05-26 RX ORDER — TIZANIDINE HYDROCHLORIDE 4 MG/1
4 CAPSULE ORAL
Qty: 60 | Refills: 0 | Status: DISCONTINUED | COMMUNITY
Start: 2022-10-19 | End: 2023-05-26

## 2023-05-26 NOTE — ASSESSMENT
[FreeTextEntry1] : C/w PT for all\par Indicated for updated imaging to discuss surgical management\par knee specialist for knee pain\par \par Gabapentin- Patient advised of sedating effects, instructed not to drive, operate machinery, or take with other sedating medications. Advised of need to taper on/off medication and risk of abruptly stopping gabapentin. \par \par Muscle Relaxants- To help decrease muscle spasm and assist with pain relief. Advised of sedating effects and instructed not to drive, operate heavy machinery, or take with other sedating medications.\par \par \par Patient seen by Sherrie Degroot PA-C, with and under the supervision of  Dr. Forest Grimm M.D.\par
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
## 2023-05-26 NOTE — IMAGING
[Facet arthropathy] : Facet arthropathy [Disc space narrowing] : Disc space narrowing [de-identified] : C spine\par \par Inspection: No rash or ecchymosis \par \par Palpation: b/l trapezial and R rhomboid tenderness\par \par ROM: diminished all planes\par \par Strength: 4/5 R deltoid and b/l , abductors. Otherwise 5/5\par \par Sensation: diminished sensation to light touch in digits 3-4 of b/l hands \par \par \par L spine\par \par Palpation: Midline lumbar/thoracic tenderness. B/l lumbar paraspinal TTP and b/l SI joint tenderness to palpation. No greater trochanter tenderness to palpation.\par \par ROM: diminished all planes\par \par Strength: 4/5 bilateral hip flexors, knee extensors, ankle dorsiflexors, EHL, ankle plantarflexors. Limited due to pain\par \par Sensation: Sensation present to light touch bilateral L2-S1 distributions \par \par Provocative maneuvers: equivocal bilateral straight leg raise\par \par unable to assess tandem walk due to recent achilles tendon surgery and altered gait\par

## 2023-06-02 ENCOUNTER — APPOINTMENT (OUTPATIENT)
Dept: ORTHOPEDIC SURGERY | Facility: CLINIC | Age: 58
End: 2023-06-02
Payer: COMMERCIAL

## 2023-06-02 VITALS — HEIGHT: 64 IN | WEIGHT: 173 LBS | BODY MASS INDEX: 29.53 KG/M2

## 2023-06-02 DIAGNOSIS — M17.11 UNILATERAL PRIMARY OSTEOARTHRITIS, RIGHT KNEE: ICD-10-CM

## 2023-06-02 DIAGNOSIS — M25.561 PAIN IN RIGHT KNEE: ICD-10-CM

## 2023-06-02 PROCEDURE — 73562 X-RAY EXAM OF KNEE 3: CPT | Mod: RT

## 2023-06-02 PROCEDURE — 99213 OFFICE O/P EST LOW 20 MIN: CPT

## 2023-06-02 NOTE — PHYSICAL EXAM
[NL (0)] : extension 0 degrees [5___] : hamstring 5[unfilled]/5 [Equivocal] : equivocal Devon [] : negative Lachmann [Right] : right knee [AP] : anteroposterior [Lateral] : lateral [Degenerative change] : Degenerative change [TWNoteComboBox7] : flexion 130 degrees

## 2023-06-02 NOTE — HISTORY OF PRESENT ILLNESS
[Gradual] : gradual [Localized] : localized [de-identified] : Had right heel surgery, then fell, ruptured right achilles which was repaired. Had been NWB for prolonged period so when she started weight bearing had pain inner side right knee, felt like it was going to buckle.  [FreeTextEntry1] : right knee  [FreeTextEntry3] : beginning of may  [FreeTextEntry5] : patient has been dealing with pain in the knee since being non weight bearing after ankle sx

## 2023-06-06 ENCOUNTER — APPOINTMENT (OUTPATIENT)
Dept: ORTHOPEDIC SURGERY | Facility: CLINIC | Age: 58
End: 2023-06-06
Payer: COMMERCIAL

## 2023-06-06 PROCEDURE — 99215 OFFICE O/P EST HI 40 MIN: CPT
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
## 2023-06-06 NOTE — IMAGING
[de-identified] : C spine\par \par Inspection: No rash or ecchymosis \par \par Palpation: b/l trapezial and R rhomboid tenderness\par \par ROM: diminished all planes\par \par Strength: 4/5 R deltoid and b/l , abductors. Otherwise 5/5\par \par Sensation: diminished sensation to light touch in digits 3-4 of b/l hands \par \par \par L spine\par \par Palpation: Midline lumbar/thoracic tenderness. B/l lumbar paraspinal TTP and b/l SI joint tenderness to palpation. No greater trochanter tenderness to palpation.\par \par ROM: diminished all planes\par \par Strength: 4/5 bilateral hip flexors, knee extensors, ankle dorsiflexors, EHL, ankle plantarflexors. Limited due to pain\par \par Sensation: Sensation present to light touch bilateral L2-S1 distributions \par \par Provocative maneuvers: equivocal bilateral straight leg raise\par \par unable to assess tandem walk due to recent achilles tendon surgery and altered gait\par

## 2023-06-06 NOTE — ASSESSMENT
[FreeTextEntry1] : C/w PT for all\par Surgery discussed, is undergoing treatment for Graves\par knee specialist for knee pain\par \par Gabapentin- Patient advised of sedating effects, instructed not to drive, operate machinery, or take with other sedating medications. Advised of need to taper on/off medication and risk of abruptly stopping gabapentin. \par \par Muscle Relaxants- To help decrease muscle spasm and assist with pain relief. Advised of sedating effects and instructed not to drive, operate heavy machinery, or take with other sedating medications.\par

## 2023-07-05 ENCOUNTER — APPOINTMENT (OUTPATIENT)
Dept: PAIN MANAGEMENT | Facility: CLINIC | Age: 58
End: 2023-07-05
Payer: COMMERCIAL

## 2023-07-05 PROCEDURE — 62323 NJX INTERLAMINAR LMBR/SAC: CPT

## 2023-07-05 NOTE — PROCEDURE
[FreeTextEntry3] : Date of Service: 07/05/2023 \par \par Account: [id number]\par \par Patient: NICKOLAS TORRES \par \par YOB: 1965\par \par Age: 57 year\par \par \par Surgeon:                                               Laly Ngo M.D.\par \par Pre-Operative Diagnosis:                   Lumbosacral Radiculitis (M54.17)     \par \par Post Operative Diagnosis:                 Lumbosacral Radiculitis (M54.17)     \par \par Procedure:                                           Interlaminar lumbar epidural steroid injection (L5-S1) under fluoroscopic guidance\par  \par Anesthesia:                MAC\par \par \par This procedure was carried out using fluoroscopic guidance.  The risks and benefits of the procedure were discussed extensively with the patient.  The consent of the patient was obtained and the following procedure was performed. \par \par The patient was placed in the prone position.  The lumbar area was prepped and draped in a sterile fashion.  Under AP view with slight cephalad-caudad angulation, the L5-S1 interspace was identified and marked.  Using sterile technique the superficial skin was anesthetized with 1% Lidocaine without epinephrine.  A 20 gauge Tuohoy needle was advanced into the epidural space under fluoroscopy using [id number]-pyhga technique and using loss of resistance at the L5-S1 level.  After negative aspiration for heme or CSF, an epidurogram was obtained using 3 cc Omnipaque contrast confirming epidural placement of the needle.  \par \par Epidurogram showed no evidence of intrathecal or intravascular flow, and good evidence of bilateral epidural flow from L3-S2 levels.  After this, 5 cc of preservative free normal saline and 80 mg of Kenalog were injected into the epidural space.\par \par The needle was subsequently removed.  Anesthesia personnel were present throughout the procedure.\par \par The patient tolerated the procedure well and was instructed to contact me immediately if there were any problems.\par \par Laly Ngo M.D.\par \par \par  \par

## 2023-07-19 ENCOUNTER — APPOINTMENT (OUTPATIENT)
Dept: PAIN MANAGEMENT | Facility: CLINIC | Age: 58
End: 2023-07-19
Payer: COMMERCIAL

## 2023-07-19 VITALS — HEIGHT: 64 IN | BODY MASS INDEX: 30.39 KG/M2 | WEIGHT: 178 LBS

## 2023-07-19 PROCEDURE — 99214 OFFICE O/P EST MOD 30 MIN: CPT

## 2023-07-19 NOTE — PHYSICAL EXAM
[Flexion] : flexion [Rotation to left] : rotation to left [Rotation to right] : rotation to right [4___] : right triceps 4[unfilled]/5 [Extension] : extension [] : no palpable masses [TWNoteComboBox7] : forward flexion 75 degrees [de-identified] : extension 20 degrees

## 2023-07-19 NOTE — HISTORY OF PRESENT ILLNESS
[Neck] : neck [Lower back] : lower back [5] : 5 [3] : 3 [Dull/Aching] : dull/aching [Tightness] : tightness [Tingling] : tingling [Intermittent] : intermittent [Sleep] : sleep [Rest] : rest [Meds] : meds [Injection therapy] : injection therapy [Sitting] : sitting [Bending forward] : bending forward [FreeTextEntry1] : 7/19/23: 75% relief from LESI.  Pain in low back is returning.  Will schedule RFA.\par \par 12/05/22: follow up today after Right lumbar RFA  L3,L4,L5 on 11/21/22. She gets intermittent numbness down the leg. Pain down the lateral left leg. Sometimes during the night she gets numbness in the right buttock. \par \par 10/19/2022: follow up today.Pain is returning in low back.  Will schedule RFA.  Is on a baby aspirin.\par \par 07/25/2022: follow up today after b/l lumbar MBB on 7/11/22. Pt with near complete resolution of her pain following. last block was over a year ago with relief. \par \par 06/27/2022: follow up today.  Had 60% relief from NICKIE.  Will start PT.  Will schedule lumbar MBB #2.\par \par 4/20/22- Patient here for follow up.  Had shoulder surgery March 1 with Dr. Sanchez.  Now has been having pain in neck and radiating to both hands.  Is on xarelto for a.fib.\par \par 5/7/21: follow up today after b/l lumbar MBB on 4/22/21. she had an overall 80-90% improvement which is sustained. has some mild pain on the left lower back and left buttock.\par \par 4/16/21: follow up today after LESI L5/S1 on 3/3/21. Pt reports an overall 30%-50% improvement. Pain in the left lower back. Radicular pain much improved. will f/u with Dr. Grimm as her EMG did reveal Cervical radiculopathy, CTS and Cubital tunnel syndrome. She is waiting to get MRI of the left shoulder.\par \par \par 2/17/21- Patient had 70% relief from NICKIE. Pain has returned in lower back. Will repeat LESI.\par Patient had 85% improvement from LESI. Had 60% relief from NICKIE. Still has some discomfort in neck.\par \par Injections- 6/6/22 NICKIE\par b/l lumbar MBB (4/22/21, 7/11/22)\par 11/21/22- B/L lumbar RFA [] : no [FreeTextEntry6] : numbness  [FreeTextEntry7] : b/l legs. more to the left leg to the calf

## 2023-07-19 NOTE — ASSESSMENT
[FreeTextEntry1] : Patient presents with axial lumbar pain that has not responded to  3 months of conservative therapy including physical therapy or NSAID therapy.  The pain is interfering with activities of daily living and functionality.   There is no radicular pain.   The pain is exacerbated by facet loading.  Positive Kemps maneuver which is defined by pain reproduction with extension and rotation of the lumbar spine to the affected side.  The patient has not had a vertebral fusion at the levels of the proposed treatment.  There is no unexplained neurologic deficit.  There is no history of systemic infection, unstable medical condition, bleeding tendency, or local infection.  The injection is being performed to diagnose the facet joint as the source of the individual's pain.\par \par B/L lumbar RFA

## 2023-08-23 ENCOUNTER — OUTPATIENT (OUTPATIENT)
Dept: OUTPATIENT SERVICES | Facility: HOSPITAL | Age: 58
LOS: 1 days | End: 2023-08-23
Payer: COMMERCIAL

## 2023-08-23 VITALS
HEIGHT: 64 IN | TEMPERATURE: 98 F | HEART RATE: 71 BPM | DIASTOLIC BLOOD PRESSURE: 70 MMHG | RESPIRATION RATE: 16 BRPM | WEIGHT: 186.51 LBS | OXYGEN SATURATION: 99 % | SYSTOLIC BLOOD PRESSURE: 132 MMHG

## 2023-08-23 DIAGNOSIS — Z98.890 OTHER SPECIFIED POSTPROCEDURAL STATES: Chronic | ICD-10-CM

## 2023-08-23 DIAGNOSIS — Z98.84 BARIATRIC SURGERY STATUS: Chronic | ICD-10-CM

## 2023-08-23 DIAGNOSIS — Z98.89 OTHER SPECIFIED POSTPROCEDURAL STATES: Chronic | ICD-10-CM

## 2023-08-23 DIAGNOSIS — Z90.3 ACQUIRED ABSENCE OF STOMACH [PART OF]: Chronic | ICD-10-CM

## 2023-08-23 DIAGNOSIS — Z98.51 TUBAL LIGATION STATUS: Chronic | ICD-10-CM

## 2023-08-23 DIAGNOSIS — Z01.818 ENCOUNTER FOR OTHER PREPROCEDURAL EXAMINATION: ICD-10-CM
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
## 2023-08-23 DIAGNOSIS — S91.341A PUNCTURE WOUND WITH FOREIGN BODY, RIGHT FOOT, INITIAL ENCOUNTER: ICD-10-CM
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
## 2023-08-23 DIAGNOSIS — Z90.710 ACQUIRED ABSENCE OF BOTH CERVIX AND UTERUS: Chronic | ICD-10-CM

## 2023-08-23 DIAGNOSIS — I10 ESSENTIAL (PRIMARY) HYPERTENSION: ICD-10-CM

## 2023-08-23 DIAGNOSIS — Z90.49 ACQUIRED ABSENCE OF OTHER SPECIFIED PARTS OF DIGESTIVE TRACT: Chronic | ICD-10-CM

## 2023-08-23 LAB
ANION GAP SERPL CALC-SCNC: 6 MMOL/L — SIGNIFICANT CHANGE UP (ref 5–17)
BUN SERPL-MCNC: 10 MG/DL — SIGNIFICANT CHANGE UP (ref 7–23)
CALCIUM SERPL-MCNC: 9.8 MG/DL — SIGNIFICANT CHANGE UP (ref 8.4–10.5)
CHLORIDE SERPL-SCNC: 104 MMOL/L — SIGNIFICANT CHANGE UP (ref 96–108)
CO2 SERPL-SCNC: 31 MMOL/L — SIGNIFICANT CHANGE UP (ref 22–31)
CREAT SERPL-MCNC: 0.94 MG/DL — SIGNIFICANT CHANGE UP (ref 0.5–1.3)
EGFR: 72 ML/MIN/1.73M2 — SIGNIFICANT CHANGE UP
GLUCOSE SERPL-MCNC: 91 MG/DL — SIGNIFICANT CHANGE UP (ref 70–99)
HCT VFR BLD CALC: 41.2 % — SIGNIFICANT CHANGE UP (ref 34.5–45)
HGB BLD-MCNC: 12.9 G/DL — SIGNIFICANT CHANGE UP (ref 11.5–15.5)
MCHC RBC-ENTMCNC: 26.8 PG — LOW (ref 27–34)
MCHC RBC-ENTMCNC: 31.3 GM/DL — LOW (ref 32–36)
MCV RBC AUTO: 85.5 FL — SIGNIFICANT CHANGE UP (ref 80–100)
NRBC # BLD: 0 /100 WBCS — SIGNIFICANT CHANGE UP (ref 0–0)
PLATELET # BLD AUTO: 288 K/UL — SIGNIFICANT CHANGE UP (ref 150–400)
POTASSIUM SERPL-MCNC: 3.9 MMOL/L — SIGNIFICANT CHANGE UP (ref 3.5–5.3)
POTASSIUM SERPL-SCNC: 3.9 MMOL/L — SIGNIFICANT CHANGE UP (ref 3.5–5.3)
RBC # BLD: 4.82 M/UL — SIGNIFICANT CHANGE UP (ref 3.8–5.2)
RBC # FLD: 14.8 % — HIGH (ref 10.3–14.5)
SODIUM SERPL-SCNC: 141 MMOL/L — SIGNIFICANT CHANGE UP (ref 135–145)
WBC # BLD: 3.99 K/UL — SIGNIFICANT CHANGE UP (ref 3.8–10.5)
WBC # FLD AUTO: 3.99 K/UL — SIGNIFICANT CHANGE UP (ref 3.8–10.5)

## 2023-08-23 PROCEDURE — 36415 COLL VENOUS BLD VENIPUNCTURE: CPT

## 2023-08-23 PROCEDURE — 80048 BASIC METABOLIC PNL TOTAL CA: CPT

## 2023-08-23 PROCEDURE — G0463: CPT

## 2023-08-23 PROCEDURE — 85027 COMPLETE CBC AUTOMATED: CPT

## 2023-08-23 RX ORDER — OXYCODONE HYDROCHLORIDE 5 MG/1
1 TABLET ORAL
Qty: 0 | Refills: 0 | DISCHARGE

## 2023-08-23 RX ORDER — FOLIC ACID 0.8 MG
1 TABLET ORAL
Qty: 0 | Refills: 0 | DISCHARGE

## 2023-08-23 RX ORDER — IBUPROFEN 200 MG
1 TABLET ORAL
Qty: 0 | Refills: 0 | DISCHARGE

## 2023-08-23 NOTE — H&P PST ADULT - HISTORY OF PRESENT ILLNESS
56 yo female presents to PST for removal of foreign body in right foot.  8/7/23 patient stepped on something sharp,  Since then patient has had right foot pain with weight bearing activities.  s/p achilles tendon repair in 3/2023.   Otherwise patient feels well and denies any acute symptoms.

## 2023-08-23 NOTE — H&P PST ADULT - NSICDXPASTSURGICALHX_GEN_ALL_CORE_FT
PAST SURGICAL HISTORY:  H/O Achilles tendon repair     H/O gastric sleeve 5/2018    History of cardiac radiofrequency ablation     History of lithotripsy     History of removal of laparoscopic gastric banding device     History of repair of right rotator cuff     LAP-BAND surgery status 05/2005    S/P breast reconstruction, bilateral 2014    S/P cholecystectomy 12/1998    S/P hernia repair 2/2017    S/P hysterectomy 2014    S/P lumpectomy, right breast 11/2006 ( Radiation x 6 weeks )    S/P tubal ligation 1998

## 2023-08-24 ENCOUNTER — TRANSCRIPTION ENCOUNTER (OUTPATIENT)
Age: 58
End: 2023-08-24

## 2023-08-24 RX ORDER — SODIUM CHLORIDE 9 MG/ML
1000 INJECTION, SOLUTION INTRAVENOUS
Refills: 0 | Status: DISCONTINUED | OUTPATIENT
Start: 2023-08-25 | End: 2023-09-08

## 2023-08-25 ENCOUNTER — OUTPATIENT (OUTPATIENT)
Dept: OUTPATIENT SERVICES | Facility: HOSPITAL | Age: 58
LOS: 1 days | End: 2023-08-25
Payer: COMMERCIAL

## 2023-08-25 ENCOUNTER — TRANSCRIPTION ENCOUNTER (OUTPATIENT)
Age: 58
End: 2023-08-25

## 2023-08-25 VITALS
RESPIRATION RATE: 16 BRPM | HEART RATE: 71 BPM | DIASTOLIC BLOOD PRESSURE: 79 MMHG | SYSTOLIC BLOOD PRESSURE: 120 MMHG | OXYGEN SATURATION: 96 %

## 2023-08-25 VITALS
WEIGHT: 186.51 LBS | SYSTOLIC BLOOD PRESSURE: 132 MMHG | RESPIRATION RATE: 15 BRPM | HEART RATE: 63 BPM | HEIGHT: 64 IN | DIASTOLIC BLOOD PRESSURE: 84 MMHG | OXYGEN SATURATION: 95 % | TEMPERATURE: 98 F

## 2023-08-25 DIAGNOSIS — Z90.3 ACQUIRED ABSENCE OF STOMACH [PART OF]: Chronic | ICD-10-CM

## 2023-08-25 DIAGNOSIS — S91.341A PUNCTURE WOUND WITH FOREIGN BODY, RIGHT FOOT, INITIAL ENCOUNTER: ICD-10-CM

## 2023-08-25 DIAGNOSIS — Z98.890 OTHER SPECIFIED POSTPROCEDURAL STATES: Chronic | ICD-10-CM

## 2023-08-25 DIAGNOSIS — Z90.710 ACQUIRED ABSENCE OF BOTH CERVIX AND UTERUS: Chronic | ICD-10-CM

## 2023-08-25 DIAGNOSIS — Z98.89 OTHER SPECIFIED POSTPROCEDURAL STATES: Chronic | ICD-10-CM

## 2023-08-25 DIAGNOSIS — Z98.84 BARIATRIC SURGERY STATUS: Chronic | ICD-10-CM

## 2023-08-25 DIAGNOSIS — Z98.51 TUBAL LIGATION STATUS: Chronic | ICD-10-CM

## 2023-08-25 DIAGNOSIS — Z90.49 ACQUIRED ABSENCE OF OTHER SPECIFIED PARTS OF DIGESTIVE TRACT: Chronic | ICD-10-CM

## 2023-08-25 PROCEDURE — 73620 X-RAY EXAM OF FOOT: CPT | Mod: 26,RT

## 2023-08-25 PROCEDURE — 76000 FLUOROSCOPY <1 HR PHYS/QHP: CPT

## 2023-08-25 PROCEDURE — 88300 SURGICAL PATH GROSS: CPT | Mod: 26

## 2023-08-25 PROCEDURE — 73620 X-RAY EXAM OF FOOT: CPT

## 2023-08-25 PROCEDURE — 28192 REMOVAL OF FOOT FOREIGN BODY: CPT | Mod: RT

## 2023-08-25 PROCEDURE — 88300 SURGICAL PATH GROSS: CPT

## 2023-08-25 RX ORDER — HYDROMORPHONE HYDROCHLORIDE 2 MG/ML
0.5 INJECTION INTRAMUSCULAR; INTRAVENOUS; SUBCUTANEOUS
Refills: 0 | Status: DISCONTINUED | OUTPATIENT
Start: 2023-08-25 | End: 2023-08-25

## 2023-08-25 RX ORDER — ONDANSETRON 8 MG/1
4 TABLET, FILM COATED ORAL ONCE
Refills: 0 | Status: DISCONTINUED | OUTPATIENT
Start: 2023-08-25 | End: 2023-08-25

## 2023-08-25 RX ORDER — SODIUM CHLORIDE 9 MG/ML
1000 INJECTION, SOLUTION INTRAVENOUS
Refills: 0 | Status: DISCONTINUED | OUTPATIENT
Start: 2023-08-25 | End: 2023-08-25

## 2023-08-25 RX ADMIN — SODIUM CHLORIDE 50 MILLILITER(S): 9 INJECTION, SOLUTION INTRAVENOUS at 11:39

## 2023-08-25 RX ADMIN — HYDROMORPHONE HYDROCHLORIDE 0.5 MILLIGRAM(S): 2 INJECTION INTRAMUSCULAR; INTRAVENOUS; SUBCUTANEOUS at 14:13

## 2023-08-25 RX ADMIN — HYDROMORPHONE HYDROCHLORIDE 0.5 MILLIGRAM(S): 2 INJECTION INTRAMUSCULAR; INTRAVENOUS; SUBCUTANEOUS at 13:43
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
## 2023-08-25 NOTE — ASU PATIENT PROFILE, ADULT - NSSUBSTANCEUSE_GEN_ALL_CORE_SD
denied illicit drug use/caffeine denied illicit drug use/street drug/inhalant/medication abuse/caffeine

## 2023-08-25 NOTE — ASU DISCHARGE PLAN (ADULT/PEDIATRIC) - CARE PROVIDER_API CALL
Nestor Goncalves  Podiatric Medicine and Surgery  [address] [place] [address]  Phone: [phone]  Fax: [phone]  Scheduled Appointment: 08/29/2023

## 2023-08-25 NOTE — ASU DISCHARGE PLAN (ADULT/PEDIATRIC) - CALL YOUR DOCTOR IF YOU HAVE ANY OF THE FOLLOWING:
Bleeding that does not stop/Pain not relieved by Medications Bleeding that does not stop/Swelling that gets worse/Pain not relieved by Medications/Fever greater than (need to indicate Fahrenheit or Celsius)/Wound/Surgical Site with redness, or foul smelling discharge or pus/Numbness, tingling, color or temperature change to extremity/Nausea and vomiting that does not stop

## 2023-08-25 NOTE — BRIEF OPERATIVE NOTE - NSICDXBRIEFPROCEDURE_GEN_ALL_CORE_FT
PROCEDURES:  Arthroplasty, toe, PIP joint, for hammertoe 25-Aug-2023 11:51:45  Nestor Goncalves  Open flexor tenotomy of right foot 25-Aug-2023 11:52:18  Nestor Goncalves  
PROCEDURES:  Simple removal, foreign body 25-Aug-2023 12:57:21  Nestor Goncalves

## 2023-08-29 LAB — SURGICAL PATHOLOGY STUDY: SIGNIFICANT CHANGE UP

## 2023-09-05 ENCOUNTER — APPOINTMENT (OUTPATIENT)
Dept: ORTHOPEDIC SURGERY | Facility: CLINIC | Age: 58
End: 2023-09-05
Payer: COMMERCIAL

## 2023-09-05 PROCEDURE — 99214 OFFICE O/P EST MOD 30 MIN: CPT

## 2023-09-05 RX ORDER — METHOCARBAMOL 500 MG/1
500 TABLET, FILM COATED ORAL
Qty: 60 | Refills: 0 | Status: DISCONTINUED | COMMUNITY
Start: 2023-05-26 | End: 2023-09-05

## 2023-09-05 RX ORDER — TIZANIDINE 4 MG/1
4 TABLET ORAL
Qty: 90 | Refills: 0 | Status: ACTIVE | COMMUNITY
Start: 2023-09-05 | End: 1900-01-01

## 2023-09-05 NOTE — ASSESSMENT
[FreeTextEntry1] : C/w PT for all. Has to suspend PT for now due to recent foot surgery Surgery discussed, is undergoing treatment for Graves knee specialist for knee pain  Gabapentin- Patient advised of sedating effects, instructed not to drive, operate machinery, or take with other sedating medications. Advised of need to taper on/off medication and risk of abruptly stopping gabapentin.   Muscle Relaxants- To help decrease muscle spasm and assist with pain relief. Advised of sedating effects and instructed not to drive, operate heavy machinery, or take with other sedating medications.  Patient seen by Sherrie Degroot PA-C, under the supervision of  Dr. Forest Grimm M.D.
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
## 2023-09-05 NOTE — IMAGING
[de-identified] : C spine\par  \par  Inspection: No rash or ecchymosis \par  \par  Palpation: b/l trapezial and R rhomboid tenderness\par  \par  ROM: diminished all planes\par  \par  Strength: 4/5 R deltoid and b/l , abductors. Otherwise 5/5\par  \par  Sensation: diminished sensation to light touch in digits 3-4 of b/l hands \par  \par  \par  L spine\par  \par  Palpation: Midline lumbar/thoracic tenderness. B/l lumbar paraspinal TTP and b/l SI joint tenderness to palpation. No greater trochanter tenderness to palpation.\par  \par  ROM: diminished all planes\par  \par  Strength: 4/5 bilateral hip flexors, knee extensors, ankle dorsiflexors, EHL, ankle plantarflexors. Limited due to pain\par  \par  Sensation: Sensation present to light touch bilateral L2-S1 distributions \par  \par  Provocative maneuvers: equivocal bilateral straight leg raise\par  \par  unable to assess tandem walk due to recent achilles tendon surgery and altered gait\par

## 2023-09-13 ENCOUNTER — APPOINTMENT (OUTPATIENT)
Dept: PAIN MANAGEMENT | Facility: CLINIC | Age: 58
End: 2023-09-13
Payer: COMMERCIAL

## 2023-09-13 PROCEDURE — 64636Z: CUSTOM

## 2023-09-13 PROCEDURE — 64635 DESTROY LUMB/SAC FACET JNT: CPT | Mod: 50

## 2023-09-21 ENCOUNTER — OUTPATIENT (OUTPATIENT)
Dept: OUTPATIENT SERVICES | Facility: HOSPITAL | Age: 58
LOS: 1 days | End: 2023-09-21
Payer: COMMERCIAL

## 2023-09-21 VITALS
WEIGHT: 183.42 LBS | HEIGHT: 64 IN | HEART RATE: 68 BPM | OXYGEN SATURATION: 97 % | SYSTOLIC BLOOD PRESSURE: 136 MMHG | TEMPERATURE: 98 F | DIASTOLIC BLOOD PRESSURE: 84 MMHG | RESPIRATION RATE: 18 BRPM

## 2023-09-21 DIAGNOSIS — Z98.84 BARIATRIC SURGERY STATUS: Chronic | ICD-10-CM

## 2023-09-21 DIAGNOSIS — M20.11 HALLUX VALGUS (ACQUIRED), RIGHT FOOT: ICD-10-CM

## 2023-09-21 DIAGNOSIS — Z98.89 OTHER SPECIFIED POSTPROCEDURAL STATES: Chronic | ICD-10-CM

## 2023-09-21 DIAGNOSIS — M20.5X1 OTHER DEFORMITIES OF TOE(S) (ACQUIRED), RIGHT FOOT: ICD-10-CM

## 2023-09-21 DIAGNOSIS — M24.574 CONTRACTURE, RIGHT FOOT: ICD-10-CM

## 2023-09-21 DIAGNOSIS — Z90.710 ACQUIRED ABSENCE OF BOTH CERVIX AND UTERUS: Chronic | ICD-10-CM

## 2023-09-21 DIAGNOSIS — Z98.890 OTHER SPECIFIED POSTPROCEDURAL STATES: Chronic | ICD-10-CM

## 2023-09-21 DIAGNOSIS — Z98.51 TUBAL LIGATION STATUS: Chronic | ICD-10-CM

## 2023-09-21 DIAGNOSIS — Z90.3 ACQUIRED ABSENCE OF STOMACH [PART OF]: Chronic | ICD-10-CM

## 2023-09-21 DIAGNOSIS — M20.41 OTHER HAMMER TOE(S) (ACQUIRED), RIGHT FOOT: ICD-10-CM

## 2023-09-21 DIAGNOSIS — Z01.818 ENCOUNTER FOR OTHER PREPROCEDURAL EXAMINATION: ICD-10-CM

## 2023-09-21 DIAGNOSIS — I10 ESSENTIAL (PRIMARY) HYPERTENSION: ICD-10-CM

## 2023-09-21 DIAGNOSIS — Z90.49 ACQUIRED ABSENCE OF OTHER SPECIFIED PARTS OF DIGESTIVE TRACT: Chronic | ICD-10-CM

## 2023-09-21 PROCEDURE — 85027 COMPLETE CBC AUTOMATED: CPT

## 2023-09-21 PROCEDURE — G0463: CPT

## 2023-09-21 PROCEDURE — 36415 COLL VENOUS BLD VENIPUNCTURE: CPT

## 2023-09-21 PROCEDURE — 80048 BASIC METABOLIC PNL TOTAL CA: CPT

## 2023-09-21 RX ORDER — ASPIRIN/CALCIUM CARB/MAGNESIUM 324 MG
1 TABLET ORAL
Qty: 0 | Refills: 0 | DISCHARGE

## 2023-09-21 RX ORDER — ECHINACEA PURPUREA EXTRACT 125 MG
0 TABLET ORAL
Qty: 0 | Refills: 0 | DISCHARGE

## 2023-09-21 NOTE — H&P PST ADULT - NECK
limited ROM neck d/t pain in neck/thyromegaly limited ROM neck d/t pain in neck/normal/supple/symmetric/no tracheal deviation/no thyromegaly/no palpable masses/thyromegaly

## 2023-09-21 NOTE — H&P PST ADULT - RX
reports being dx with IRMA, had weight lost surgery in 2018 or 2019, has not had a repeat study but no longer uses cpap machine

## 2023-09-21 NOTE — H&P PST ADULT - PROBLEM SELECTOR PLAN 1
Patient provided with pre-operative instructions and verbalized understanding.  Patient can take metoprolol but otherwise will be NPO on day of surgery. Patient will stop NSAIDs, aspirin, herbal supplements or vitamins 1 week prior to surgery.  Chlorhexidine wash provided with instructions, verbalized understanding.      Pending medical clearance as per surgeon.      EKG from PCP  CBC BMP collected, pending results

## 2023-09-21 NOTE — H&P PST ADULT - MUSCULOSKELETAL
no joint swelling/no joint erythema/no joint warmth/no calf tenderness/normal gait details… ROM intact/no joint swelling/no joint erythema/no joint warmth/no calf tenderness/normal gait

## 2023-09-21 NOTE — H&P PST ADULT - NEUROLOGICAL
NEUROLOGY CONSULTATION    Inpatient consult to Neurology  Consult performed by: Marin Nelson MD  Consult ordered by: Cosme Lara MD           Reason for Consultation: Neurology consultation for continuation of care in 55-year-old right-handed  female with remote history of left parieto-occipital AVM treated surgically about 25 years ago, with subsequent gamma knife treatment for residual AVM, developed right-sided weakness and found to have brain abscess at same location. Treated surgically. Placed on antibiotics. Also known for seizures and severe migraines. Followed by outside neurology for seizures and migraines, failed multiple prophylactic therapies including topiramate and amitriptyline. Now transferred to rehab, no new complaints, right side improving, headaches improved. Does not want to stay on amitriptyline.    HISTORY OF PRESENT ILLNESS  Ms. Ring is a 55 year old year old, female, seen in neurologic consultation for left parieto-occipital abscess, status post drainage, right-sided weakness improving, epilepsy well-controlled, migraines recurrent chronic.   History was obtained from patient, patient's family, chart.      HISTORIES:   has a past medical history of Colon cancer (CMS/HCC) and High cholesterol.     has a past surgical history that includes brain surgery and removal gallbladder.    family history is not on file.     reports that she has never smoked. She has never used smokeless tobacco. She reports that she does not drink alcohol and does not use drugs.    INPATIENT MEDICATIONS:  • sodium chloride (PF)  2 mL Intracatheter 2 times per day   • Magnesium Standard Replacement Protocol   Does not apply See Admin Instructions   • Phosphorus Standard Replacement Protocol   Does not apply See Admin Instructions   • Potassium Standard Replacement Protocol   Does not apply See Admin Instructions   • docusate sodium  200 mg Oral BID    Or   • docusate sodium  100 mg Per NG tube BID   •  sodium chloride (PF)  10 mL Injection 2 times per day   • amitriptyline  25 mg Oral Nightly   • aztreonam  2 g Intravenous Q6H   • butalbital-APAP-caffeine  1 tablet Oral Q4H While awake   • carBAMazepine  400 mg Oral BID   • cyclobenzaprine  5 mg Oral TID   • [START ON 11/4/2021] dexamethasone  2 mg Intravenous Q12H   • [START ON 11/8/2021] dexamethasone  2 mg Intravenous Daily   • dexamethasone  4 mg Intravenous 3 times per day   • [START ON 11/1/2021] dexamethasone  4 mg Intravenous 2 times per day   • heparin (porcine)  5,000 Units Subcutaneous 3 times per day   • lamoTRIgine  150 mg Oral BID   • levETIRAcetam  2,000 mg Oral Q8H   • metroNIDAZOLE (FLAGYL) IVPB  500 mg Intravenous Q6H   • pantoprazole  40 mg Oral QAM AC   • potassium CHLORIDE  20 mEq Oral Daily with breakfast   • pravastatin  80 mg Oral Daily   • sodium chloride  500 mL Intravenous Once   • vancomycin (VANCOCIN) IVPB  1,750 mg Intravenous 2 times per day   • VANCOMYCIN - PHARMACIST MONITORED   Does not apply See Admin Instructions         sodium chloride, ondansetron **OR** ondansetron, sodium chloride (PF), sodium chloride (PF), alteplase, HYDROcodone-acetaminophen, bisacodyl, docusate sodium-sennosides, magnesium hydroxide, melatonin     HOME MEDICATIONS:  Medications Prior to Admission   Medication Sig Dispense Refill   • levetiracetam (KEPPRA) 1000 MG tablet Take 2,000 mg by mouth 3 times daily.     • lamoTRIgine (LaMICtal) 150 MG tablet Take 150 mg by mouth 2 times daily.     • carBAMazepine (TEGretol) 200 MG tablet Take 400 mg by mouth 2 times daily.     • omeprazole (PriLOSEC) 40 MG capsule Take 40 mg by mouth daily.     • cyclobenzaprine (FLEXERIL) 10 MG tablet Take 10 mg by mouth 3 times daily.     • pravastatin (PRAVACHOL) 80 MG tablet Take 80 mg by mouth daily.     • butalbital-APAP-caffeine (FIORICET) -40 MG per tablet Take 1 tablet by mouth every 4 hours as needed.     • furosemide (LASIX) 20 MG tablet Take 20 mg by mouth  daily. Take 2 tablets by mouth every other day and one tablet every other day         ALLERGIES:  ALLERGIES:   Allergen Reactions   • Ceftriaxone CARDIAC DISTURBANCES, SHORTNESS OF BREATH and PRURITUS   • Aspirin GI UPSET   • Codeine GI UPSET   • Morphine GI UPSET         REVIEW OF SYSTEMS :   Constitutional: Negative for fever, chills, change in appetite, still some expressive aphasia.  Skin: Negative for rash or wounds.  HEENT: Negative for eye drainage, rhinorrhea, ear pain, sore throat or neck pain.  Respiratory: Negative cough, wheezing or shortness of breath.  Cardiovascular: Negative for chest pain, chest pressure, palpitations or diaphoresis.  Gastrointestinal: Negative for nausea, vomiting, diarrhea, abdominal pain, black or tarry stools.  Genitourinary: Negative for dysuria, urgency, frequency, hematuria or flank pain.  Extremities:  Negative for joint swelling or joint pain.  Neurologic: Right-sided weakness prior to the admission and surgery, headaches, recurrent, no seizures. Endocrine: Negative for heat or cold intolerance, weight loss or gain.  Hematological: Negative for bleeding, bruising or lymphadenopathy.  Psychiatric: Negative for change in affect, anxiety, depression, mentation or sleep disturbance.    PHYSICAL EXAM  Vital Last Value 24 Hour Range   Temperature 97.9 °F (36.6 °C) (10/30/21 0440) Temp  Min: 97.9 °F (36.6 °C)  Max: 98.6 °F (37 °C)   Pulse 61 (10/30/21 0440) Pulse  Min: 61  Max: 72   Respiratory 16 (10/30/21 1120) Resp  Min: 16  Max: 16   Non-Invasive  Blood Pressure 119/72 (10/30/21 0440) BP  Min: 113/68  Max: 120/70   Pulse Oximetry 94 % (10/30/21 0440) SpO2  Min: 92 %  Max: 94 %   Arterial   Blood Pressure   No data recorded     General: Awake, alert, some expressive aphasia, right-sided weakness improving with solid strength. In no distress.  Skin: Intact.  Head: Normocephalic, atraumatic, symmetrical facial movements now.  Neck: Neck supple with no carotid bruit, no palpable  masses.  Eyes: Gaze normal with no nystagmus. Fields full to confrontation. Pupils equal reactive at 3 mm symmetrically.  ENT:   No ear nose discharges.  Cardiovascular: Heart had a regular rate and rhythm.  Respiratory:   Respiratory no distress.  Gastrointestinal: Abdomen soft and nontender.  Musculoskeletal: Upper and lower extremities showed right-sided weakness at 4/5 with drift, no sensory deficit. Left side moving fully. Gait not tested at this time. Trace reflexes in both arms knees and ankles without clonus. Toes downgoing.  Back:   Unremarkable.  Neurologic:   Awake alert with expressive aphasia mild right hemiparesis overall stable.  Psychiatric:   Communicative, cooperative.      LABORATORY  Admission on 10/29/2021   Component Date Value Ref Range Status   • Sodium 10/30/2021 138  135 - 145 mmol/L Final   • Potassium 10/30/2021 4.0  3.4 - 5.1 mmol/L Final   • Chloride 10/30/2021 107  98 - 107 mmol/L Final   • Carbon Dioxide 10/30/2021 24  21 - 32 mmol/L Final   • Anion Gap 10/30/2021 11  10 - 20 mmol/L Final   • Glucose 10/30/2021 108* 70 - 99 mg/dL Final   • BUN 10/30/2021 9  6 - 20 mg/dL Final   • Creatinine 10/30/2021 0.55  0.51 - 0.95 mg/dL Final   • Glomerular Filtration Rate 10/30/2021 >90  >=60 Final    eGFR results = or >60 mL/min/1.73m2 = Normal kidney function. Estimated GFR calculated using the 2009 CKD-EPI creatinine equation.     • BUN/ Creatinine Ratio 10/30/2021 16  7 - 25 Final   • Calcium 10/30/2021 8.7  8.4 - 10.2 mg/dL Final   • WBC 10/30/2021 7.0  4.2 - 11.0 K/mcL Final   • RBC 10/30/2021 3.66* 4.00 - 5.20 mil/mcL Final   • HGB 10/30/2021 10.9* 12.0 - 15.5 g/dL Final   • HCT 10/30/2021 32.9* 36.0 - 46.5 % Final   • MCV 10/30/2021 89.9  78.0 - 100.0 fl Final   • MCH 10/30/2021 29.8  26.0 - 34.0 pg Final   • MCHC 10/30/2021 33.1  32.0 - 36.5 g/dL Final   • RDW-CV 10/30/2021 13.4  11.0 - 15.0 % Final   • RDW-SD 10/30/2021 43.8  39.0 - 50.0 fL Final   • PLT 10/30/2021 184  140 - 450 K/mcL  Final   • NRBC 10/30/2021 0  <=0 /100 WBC Final   • Neutrophil, Percent 10/30/2021 68  % Final   • Lymphocytes, Percent 10/30/2021 22  % Final   • Mono, Percent 10/30/2021 7  % Final   • Eosinophils, Percent 10/30/2021 1  % Final   • Basophils, Percent 10/30/2021 1  % Final   • Immature Granulocytes 10/30/2021 1  % Final   • Absolute Neutrophils 10/30/2021 4.8  1.8 - 7.7 K/mcL Final   • Absolute Lymphocytes 10/30/2021 1.6  1.0 - 4.0 K/mcL Final   • Absolute Monocytes 10/30/2021 0.5  0.3 - 0.9 K/mcL Final   • Absolute Eosinophils  10/30/2021 0.1  0.0 - 0.5 K/mcL Final   • Absolute Basophils 10/30/2021 0.0  0.0 - 0.3 K/mcL Final   • Absolute Immmature Granulocytes 10/30/2021 0.1  0.0 - 0.2 K/mcL Final          IMAGING  No results found.     ASSESSMENT/PLAN  1. Right hemiparesis due to left parieto-occipital abscess, status post surgical drainage.  2. Status post left parieto-occipital AVM resected surgically and treated with gamma knife decades ago.   3. Seizures.  4. Chronic migraines now flared up.  5. Currently in stable condition.    Plan:    Continue with current management with steroids and antibiotics as per neurosurgery/ID.  Seizures well-controlled, continue with antiepileptics as is. Expressive aphasia and right hemiparesis due to local structural changes in the left brain rather than epilepsy.  Migraines, chronic, severe, failed topiramate and amitriptyline therefore we will stop it. Followed by neurologist on outpatient basis already for migraines. May consider CGRP's on outpatient basis such as Aimovig, or alike's.  Continue with physical therapy and increase activity.  We will follow clinically.  Call with questions.  Thank.        Marin Nelson MD   10/30/2021    negative sensation intact/responds to pain/responds to verbal commands

## 2023-09-21 NOTE — H&P PST ADULT - HISTORY OF PRESENT ILLNESS
56 yo female presents to PST for removal of foreign body in right foot.  8/7/23 patient stepped on something sharp,  Since then patient has had right foot pain with weight bearing activities.  s/p achilles tendon repair in 3/2023.   Otherwise patient feels well and denies any acute symptoms.     Patient is a XX year old M/F with PMHx of __ or no pertinent medical history presents for preoperative testing for ___ with  ___ scheduled for /2023. Reports__. Denies ___ or any acute symptoms at this time. Patient reports feeling well overall.      bunion since highschool, reports toe shfiting, opted for surgery   wider shoes and sponges bewteen toes, not helped  Patient is a 57 year old F presents for preoperative testing for vannessa akin right foot tenotomy and capsulotomy digits 2,3,5 with Dr. Nestor Goncalves scheduled for 10/06/2023. Reports having right foot bunion since high school, pt reports years ago, started to develop pain and her toe is shifting pt has opted for surgical intervention. Reports using wider shoes and toe sponges with no help with pain. Denies any acute symptoms at this time. Patient reports feeling well overall.

## 2023-09-27 ENCOUNTER — APPOINTMENT (OUTPATIENT)
Dept: PAIN MANAGEMENT | Facility: CLINIC | Age: 58
End: 2023-09-27
Payer: COMMERCIAL

## 2023-09-27 VITALS — BODY MASS INDEX: 31.07 KG/M2 | WEIGHT: 182 LBS | HEIGHT: 64 IN

## 2023-09-27 PROCEDURE — 99214 OFFICE O/P EST MOD 30 MIN: CPT

## 2023-10-03 ENCOUNTER — OFFICE (OUTPATIENT)
Dept: URBAN - METROPOLITAN AREA CLINIC 63 | Facility: CLINIC | Age: 58
Setting detail: OPHTHALMOLOGY
End: 2023-10-03
Payer: COMMERCIAL

## 2023-10-03 DIAGNOSIS — H25.13: ICD-10-CM

## 2023-10-03 DIAGNOSIS — H01.002: ICD-10-CM

## 2023-10-03 DIAGNOSIS — H40.013: ICD-10-CM

## 2023-10-03 DIAGNOSIS — H01.005: ICD-10-CM

## 2023-10-03 DIAGNOSIS — D31.30: ICD-10-CM

## 2023-10-03 PROCEDURE — 92083 EXTENDED VISUAL FIELD XM: CPT | Performed by: OPHTHALMOLOGY

## 2023-10-03 PROCEDURE — 92250 FUNDUS PHOTOGRAPHY W/I&R: CPT | Performed by: OPHTHALMOLOGY

## 2023-10-03 PROCEDURE — 92004 COMPRE OPH EXAM NEW PT 1/>: CPT | Performed by: OPHTHALMOLOGY

## 2023-10-03 ASSESSMENT — TONOMETRY
OS_IOP_MMHG: 18
OD_IOP_MMHG: 18

## 2023-10-03 ASSESSMENT — CONFRONTATIONAL VISUAL FIELD TEST (CVF)
OS_FINDINGS: FULL
OD_FINDINGS: FULL

## 2023-10-03 ASSESSMENT — REFRACTION_AUTOREFRACTION
OS_CYLINDER: -0.25
OD_CYLINDER: -0.50
OD_SPHERE: +1.00
OS_AXIS: 047
OD_AXIS: 119
OS_SPHERE: +1.25

## 2023-10-03 ASSESSMENT — VISUAL ACUITY
OS_BCVA: 20/25
OD_BCVA: 20/25

## 2023-10-03 ASSESSMENT — LID EXAM ASSESSMENTS
OD_BLEPHARITIS: RLL 1+
OS_BLEPHARITIS: LLL 1+

## 2023-10-03 ASSESSMENT — SPHEQUIV_DERIVED
OS_SPHEQUIV: 1.125
OD_SPHEQUIV: 0.75

## 2023-10-05 ENCOUNTER — TRANSCRIPTION ENCOUNTER (OUTPATIENT)
Age: 58
End: 2023-10-05

## 2023-10-05 RX ORDER — GABAPENTIN 400 MG/1
0 CAPSULE ORAL
Refills: 0 | DISCHARGE

## 2023-10-05 NOTE — ASU PATIENT PROFILE, ADULT - NSICDXPASTMEDICALHX_GEN_ALL_CORE_FT
PAST MEDICAL HISTORY:  2019 novel coronavirus disease (COVID-19) 12/3/2021    Asthma triggered by bronchitis; last episode 2013    Calcaneal spur, right foot     COVID-19 vaccine series completed 12/3/21 + , has tested negative since then she states.    Herniated cervical disc     Herpes simplex type 2 infection     History of atrial fibrillation     History of breast cancer 2006 right    Hypertension     Implantable loop recorder present     Kidney stone     Low folic acid     Lumbar herniated disc     Obesity (BMI 30-39.9)     Osteoarthritis     Sleep apnea does not use CPAP    SVT (supraventricular tachycardia) ablation 1/2022    Thyromegaly

## 2023-10-05 NOTE — ASU PATIENT PROFILE, ADULT - NSICDXPASTSURGICALHX_GEN_ALL_CORE_FT
PAST SURGICAL HISTORY:  H/O Achilles tendon repair     H/O gastric sleeve 5/2018    History of cardiac radiofrequency ablation     History of lithotripsy     History of removal of laparoscopic gastric banding device     History of repair of right rotator cuff     LAP-BAND surgery status 05/2005    S/P breast reconstruction, bilateral 2014    S/P cholecystectomy 12/1998    S/P hernia repair 2/2017    S/P hysterectomy 2014    S/P lumpectomy, right breast 11/2006 ( Radiation x 6 weeks )    S/P tubal ligation 1998     PAST SURGICAL HISTORY:  H/O Achilles tendon repair     H/O gastric sleeve 5/2018    History of bilateral carpal tunnel release     History of cardiac radiofrequency ablation     History of lithotripsy     History of removal of laparoscopic gastric banding device     History of repair of right rotator cuff     LAP-BAND surgery status 05/2005    S/P breast reconstruction, bilateral 2014    S/P cholecystectomy 12/1998    S/P hernia repair 2/2017    S/P hysterectomy 2014    S/P lumpectomy, right breast 11/2006 ( Radiation x 6 weeks )    S/P tubal ligation 1998

## 2023-10-05 NOTE — ASU PATIENT PROFILE, ADULT - FALL HARM RISK - RISK INTERVENTIONS

## 2023-10-06 ENCOUNTER — OUTPATIENT (OUTPATIENT)
Dept: OUTPATIENT SERVICES | Facility: HOSPITAL | Age: 58
LOS: 1 days | End: 2023-10-06
Payer: COMMERCIAL

## 2023-10-06 ENCOUNTER — TRANSCRIPTION ENCOUNTER (OUTPATIENT)
Age: 58
End: 2023-10-06

## 2023-10-06 VITALS
HEART RATE: 72 BPM | RESPIRATION RATE: 14 BRPM | SYSTOLIC BLOOD PRESSURE: 147 MMHG | OXYGEN SATURATION: 98 % | DIASTOLIC BLOOD PRESSURE: 87 MMHG | WEIGHT: 183.42 LBS | HEIGHT: 64 IN | TEMPERATURE: 98 F

## 2023-10-06 VITALS
TEMPERATURE: 97 F | SYSTOLIC BLOOD PRESSURE: 118 MMHG | HEART RATE: 74 BPM | OXYGEN SATURATION: 98 % | RESPIRATION RATE: 16 BRPM | DIASTOLIC BLOOD PRESSURE: 72 MMHG

## 2023-10-06 DIAGNOSIS — Z98.89 OTHER SPECIFIED POSTPROCEDURAL STATES: Chronic | ICD-10-CM

## 2023-10-06 DIAGNOSIS — Z98.890 OTHER SPECIFIED POSTPROCEDURAL STATES: Chronic | ICD-10-CM

## 2023-10-06 DIAGNOSIS — Z98.84 BARIATRIC SURGERY STATUS: Chronic | ICD-10-CM

## 2023-10-06 DIAGNOSIS — Z90.49 ACQUIRED ABSENCE OF OTHER SPECIFIED PARTS OF DIGESTIVE TRACT: Chronic | ICD-10-CM

## 2023-10-06 DIAGNOSIS — M24.574 CONTRACTURE, RIGHT FOOT: ICD-10-CM

## 2023-10-06 DIAGNOSIS — M20.11 HALLUX VALGUS (ACQUIRED), RIGHT FOOT: ICD-10-CM

## 2023-10-06 DIAGNOSIS — M20.41 OTHER HAMMER TOE(S) (ACQUIRED), RIGHT FOOT: ICD-10-CM

## 2023-10-06 DIAGNOSIS — Z98.51 TUBAL LIGATION STATUS: Chronic | ICD-10-CM

## 2023-10-06 DIAGNOSIS — M20.5X1 OTHER DEFORMITIES OF TOE(S) (ACQUIRED), RIGHT FOOT: ICD-10-CM

## 2023-10-06 DIAGNOSIS — Z90.710 ACQUIRED ABSENCE OF BOTH CERVIX AND UTERUS: Chronic | ICD-10-CM

## 2023-10-06 DIAGNOSIS — Z90.3 ACQUIRED ABSENCE OF STOMACH [PART OF]: Chronic | ICD-10-CM

## 2023-10-06 PROCEDURE — 28270 RELEASE OF FOOT CONTRACTURE: CPT | Mod: XS,RT

## 2023-10-06 PROCEDURE — 88304 TISSUE EXAM BY PATHOLOGIST: CPT | Mod: 26

## 2023-10-06 PROCEDURE — 28272 RELEASE OF TOE JOINT EACH: CPT | Mod: T8

## 2023-10-06 PROCEDURE — 88311 DECALCIFY TISSUE: CPT | Mod: 26

## 2023-10-06 PROCEDURE — C1713: CPT

## 2023-10-06 PROCEDURE — C1889: CPT

## 2023-10-06 PROCEDURE — 28299 COR HLX VLGS DOUBLE OSTEOT: CPT | Mod: RT

## 2023-10-06 PROCEDURE — 73620 X-RAY EXAM OF FOOT: CPT | Mod: 26,RT

## 2023-10-06 PROCEDURE — 88304 TISSUE EXAM BY PATHOLOGIST: CPT

## 2023-10-06 PROCEDURE — 73620 X-RAY EXAM OF FOOT: CPT

## 2023-10-06 PROCEDURE — 88311 DECALCIFY TISSUE: CPT

## 2023-10-06 DEVICE — SCREW CORTEX S-T 2.7X16MM: Type: IMPLANTABLE DEVICE | Site: RIGHT | Status: FUNCTIONAL

## 2023-10-06 DEVICE — SCREW CORTEX S-T 2.7X18MM: Type: IMPLANTABLE DEVICE | Site: RIGHT | Status: FUNCTIONAL

## 2023-10-06 DEVICE — SCREW CORT SELF TAP 2MM 16MM: Type: IMPLANTABLE DEVICE | Site: RIGHT | Status: FUNCTIONAL

## 2023-10-06 RX ORDER — SODIUM CHLORIDE 9 MG/ML
1000 INJECTION, SOLUTION INTRAVENOUS
Refills: 0 | Status: DISCONTINUED | OUTPATIENT
Start: 2023-10-06 | End: 2023-10-06

## 2023-10-06 RX ORDER — HYDROMORPHONE HYDROCHLORIDE 2 MG/ML
0.5 INJECTION INTRAMUSCULAR; INTRAVENOUS; SUBCUTANEOUS
Refills: 0 | Status: DISCONTINUED | OUTPATIENT
Start: 2023-10-06 | End: 2023-10-06

## 2023-10-06 RX ORDER — ONDANSETRON 8 MG/1
4 TABLET, FILM COATED ORAL ONCE
Refills: 0 | Status: DISCONTINUED | OUTPATIENT
Start: 2023-10-06 | End: 2023-10-06

## 2023-10-06 RX ORDER — HYDROMORPHONE HYDROCHLORIDE 2 MG/ML
1 INJECTION INTRAMUSCULAR; INTRAVENOUS; SUBCUTANEOUS
Refills: 0 | Status: DISCONTINUED | OUTPATIENT
Start: 2023-10-06 | End: 2023-10-06

## 2023-10-06 RX ADMIN — SODIUM CHLORIDE 50 MILLILITER(S): 9 INJECTION, SOLUTION INTRAVENOUS at 12:46

## 2023-10-06 NOTE — PRE-ANESTHESIA EVALUATION ADULT - NSANTHADDINFOFT_GEN_ALL_CORE
Discussed IVS/block by Podiatry/GA with LMA in detail with patient and all questions sought and answered. Pt. agrees to all plans and wishes to proceed with surgical care.    Medical evaluation/optimization and clearance noted and appreciated.

## 2023-10-06 NOTE — ASU DISCHARGE PLAN (ADULT/PEDIATRIC) - POST OP PHONE #
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   HO Lymphoma s/p chemo was in partial remission  Recent Dx of NET of the liver    Informed consent was obtained from the patient and family after risks, benefits and alternatives were described in detail prior to the procedure. Using the feedback technique, we determined their appropriate understanding of the indication for the procedure, expected results, and potential associated complications.    With the patient in a supine position after induction of general anesthesia, the patient was intubated with an endotracheal tube (ETT). Once this was secured in place, we advanced the flexible bronchoscope inside the patient’s airway. We positioned the tube such that the tip was in mid trachea at 3 cm above the main hannah. Then complete examination of the airway was performed. The airways were normal. The bronchoscope was then pulled out and we inserted the dedicated EBUS-FNA scope. Systematic sonographic exploration of the mediastinum was performed. We noted the following lymph node stations: 2R and 7. EBUS-FNA was performed from stations 7. A total of 4 passes were performed from station 7 with each of 20 jabs. Cell block/core tissue in cytolyt/formalin and flowcytometry was sent as well from stations 7. Then the EBUS scope was removed and we reintroduced the flexible bronchoscope. The minimal secretions were suctioned. There was no evidence of bleeding and then the scope was removed and procedure was terminated. The patient was extubated in the operating room and transferred to recovery room        
[phone]

## 2023-10-06 NOTE — PRE-ANESTHESIA EVALUATION ADULT - NSANTHINDVINFOSD_GEN_ALL_CORE
-- DO NOT REPLY / DO NOT REPLY ALL --  -- Message is from Engagement Center Operations (ECO) --    General Patient Message: Patient is returning a call to schedule a colonoscopy.  Please contact the patient regarding the concern.     Caller Information       Type Contact Phone/Fax    04/12/2023 12:41 PM CDT Phone (Incoming) Timothy Moctezuma (Self) [phone] (M)        Alternative phone number: no    Can a detailed message be left? Yes    Message Turnaround:     Is it Working Hours? Yes - Working Hours     [place]:    Please give this turnaround time to the caller:   \"This message will be sent to [state Provider's name]. The clinical team will fulfill your request as soon as they review your message.\"                             patient

## 2023-10-06 NOTE — BRIEF OPERATIVE NOTE - NSICDXBRIEFPROCEDURE_GEN_ALL_CORE_FT
PROCEDURES:  Jose M-Coleman bunionectomy of right foot 06-Oct-2023 14:53:12  Nestor Goncalves  Modified Cohen bunionectomy of right foot 06-Oct-2023 14:53:20  Nestor Goncalves  Open flexor tenotomy of right foot 06-Oct-2023 14:53:28  Nestor Goncalves

## 2023-10-06 NOTE — ASU DISCHARGE PLAN (ADULT/PEDIATRIC) - PROCEDURE
vannessa and akin bunionectomy, 2nd mtpj release, modified villalpando, flexor tenotomy and capsulotomy of digits 2,3,4,5 right foot

## 2023-10-06 NOTE — ASU DISCHARGE PLAN (ADULT/PEDIATRIC) - CARE PROVIDER_API CALL
Nestor Goncalves  Podiatric Medicine and Surgery  [facility] [place] [address]  Phone: [phone]  Fax: [phone]  Scheduled Appointment: 10/10/2023

## 2023-10-06 NOTE — BRIEF OPERATIVE NOTE - NSICDXBRIEFPREOP_GEN_ALL_CORE_FT
PRE-OP DIAGNOSIS:  Hallux valgus, right 06-Oct-2023 14:53:42  Nestor Goncalves  Acquired hammertoe of right foot 06-Oct-2023 14:53:49  Nestor Goncalves

## 2023-10-06 NOTE — BRIEF OPERATIVE NOTE - NSICDXBRIEFPOSTOP_GEN_ALL_CORE_FT
POST-OP DIAGNOSIS:  Hallux abducto valgus, right 06-Oct-2023 14:54:00  Nestor Goncalves  Acquired hammertoe of right foot 06-Oct-2023 14:54:08  Nestor Gonaclves

## 2023-10-11 LAB — SURGICAL PATHOLOGY STUDY: SIGNIFICANT CHANGE UP

## 2023-11-07 ENCOUNTER — APPOINTMENT (OUTPATIENT)
Dept: ORTHOPEDIC SURGERY | Facility: CLINIC | Age: 58
End: 2023-11-07
Payer: COMMERCIAL

## 2023-11-07 DIAGNOSIS — M51.36 OTHER INTERVERTEBRAL DISC DEGENERATION, LUMBAR REGION: ICD-10-CM

## 2023-11-07 PROCEDURE — 99214 OFFICE O/P EST MOD 30 MIN: CPT

## 2023-11-20 NOTE — ASU PATIENT PROFILE, ADULT - TOBACCO USE
Appointment scheduled with NP.  Advised to go to Urgent Care/ED if care needed prior to appointment.   
Please triage  
Former smoker

## 2023-12-27 PROBLEM — I47.1 SUPRAVENTRICULAR TACHYCARDIA: Chronic | Status: ACTIVE | Noted: 2022-01-11

## 2023-12-27 PROBLEM — Z85.3 PERSONAL HISTORY OF MALIGNANT NEOPLASM OF BREAST: Chronic | Status: ACTIVE | Noted: 2022-02-09

## 2024-01-04 ENCOUNTER — OUTPATIENT (OUTPATIENT)
Dept: OUTPATIENT SERVICES | Facility: HOSPITAL | Age: 59
LOS: 1 days | End: 2024-01-04
Payer: COMMERCIAL

## 2024-01-04 VITALS
OXYGEN SATURATION: 98 % | TEMPERATURE: 98 F | RESPIRATION RATE: 18 BRPM | HEART RATE: 79 BPM | HEIGHT: 64 IN | DIASTOLIC BLOOD PRESSURE: 84 MMHG | WEIGHT: 189.6 LBS | SYSTOLIC BLOOD PRESSURE: 130 MMHG

## 2024-01-04 DIAGNOSIS — Z98.890 OTHER SPECIFIED POSTPROCEDURAL STATES: Chronic | ICD-10-CM

## 2024-01-04 DIAGNOSIS — M20.42 OTHER HAMMER TOE(S) (ACQUIRED), LEFT FOOT: ICD-10-CM

## 2024-01-04 DIAGNOSIS — Z01.818 ENCOUNTER FOR OTHER PREPROCEDURAL EXAMINATION: ICD-10-CM

## 2024-01-04 DIAGNOSIS — M20.5X2 OTHER DEFORMITIES OF TOE(S) (ACQUIRED), LEFT FOOT: ICD-10-CM

## 2024-01-04 DIAGNOSIS — Z98.89 OTHER SPECIFIED POSTPROCEDURAL STATES: Chronic | ICD-10-CM

## 2024-01-04 DIAGNOSIS — Z98.51 TUBAL LIGATION STATUS: Chronic | ICD-10-CM

## 2024-01-04 DIAGNOSIS — M20.12 HALLUX VALGUS (ACQUIRED), LEFT FOOT: ICD-10-CM

## 2024-01-04 DIAGNOSIS — Z98.84 BARIATRIC SURGERY STATUS: Chronic | ICD-10-CM

## 2024-01-04 DIAGNOSIS — Z90.710 ACQUIRED ABSENCE OF BOTH CERVIX AND UTERUS: Chronic | ICD-10-CM

## 2024-01-04 DIAGNOSIS — M24.575 CONTRACTURE, LEFT FOOT: ICD-10-CM

## 2024-01-04 DIAGNOSIS — Z90.49 ACQUIRED ABSENCE OF OTHER SPECIFIED PARTS OF DIGESTIVE TRACT: Chronic | ICD-10-CM

## 2024-01-04 DIAGNOSIS — Z90.3 ACQUIRED ABSENCE OF STOMACH [PART OF]: Chronic | ICD-10-CM

## 2024-01-04 DIAGNOSIS — I10 ESSENTIAL (PRIMARY) HYPERTENSION: ICD-10-CM

## 2024-01-04 RX ORDER — ASPIRIN/ACETAMINOPHEN/CAFFEINE 250-250-65
1 TABLET ORAL
Refills: 0 | DISCHARGE
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
## 2024-01-04 NOTE — H&P PST ADULT - NSICDXPASTSURGICALHX_GEN_ALL_CORE_FT
PAST SURGICAL HISTORY:  H/O Achilles tendon repair     H/O gastric sleeve 5/2018    History of bilateral carpal tunnel release     History of bunionectomy of right great toe     History of cardiac radiofrequency ablation     History of lithotripsy     History of removal of laparoscopic gastric banding device     History of repair of right rotator cuff     LAP-BAND surgery status 05/2005    S/P breast reconstruction, bilateral 2014    S/P cholecystectomy 12/1998    S/P hernia repair 2/2017    S/P hysterectomy 2014    S/P lumpectomy, right breast 11/2006 ( Radiation x 6 weeks )    S/P tubal ligation 1998

## 2024-01-04 NOTE — H&P PST ADULT - HISTORY OF PRESENT ILLNESS
Patient is a 58 year old F presents for preoperative testing for vannessa akin left foot tenotomy and capsulotomy digits 2,3,5 with Dr. Nestor Goncalves scheduled for 01/12/2024. Reports having right foot bunion since high school, pt reports years ago, started to develop pain and her toe is shifting pt has opted for surgical intervention. Reports using wider shoes and toe sponges with no help with pain. Denies any acute symptoms at this time. Patient reports feeling well overall.     Patient is a 58 year old F presents for preoperative testing for vannessa akin left foot tenotomy and capsulotomy digits 2,3,5 left foot with Dr. Nestor Goncalves scheduled for 01/12/2024. Reports having left foot bunion for years, started to develop pain therefore pt has opted for surgical intervention. Reports using wider shoes and toe sponges with no help with pain. Patient has right bunionectomy in 10/2023 which has helped with her pain. Denies any acute symptoms at this time. Patient reports feeling well overall.

## 2024-01-04 NOTE — H&P PST ADULT - COMMENTS
Denies h/o or family h/o DVT, PE  Denies bleeding or clotting disorders  Denies dentures/partials, loose teeth/caps 168/91 electronic

## 2024-01-04 NOTE — H&P PST ADULT - LOCATION OF BACK PAIN
[Consultation] : a consultation regarding [Dizziness] : dizziness cervical spine/lumbar spine/shoulder R cervical spine/lumbar spine

## 2024-01-04 NOTE — H&P PST ADULT - MUSCULOSKELETAL COMMENTS
right foot pain, see HPI for further details hallux valgus left foot left foot pain, see HPI for further details

## 2024-01-04 NOTE — H&P PST ADULT - TOBACCO USE
Care Management - Received consult to assist patient with ambulatory transportation home. 8:25 AM Met with patient in his ED room. He does have a son and a daughter-in-law. Son works for city and daughter-in-law from home. He does not want to ask them to come get him as he thinks they cannot leave work. He said he did not ask them. CM explained that CM would call his insurance [employer], but transport may take 3 hours. 8:30 AM Called his transportation [facility] ([phone]). Spoke with Daisy. Set up for 9:00 AM. Reference number is [id number].     10:15 AM Spoke with RN. Patient is on his way home in the [place].      Albino Goldmann, MSW Former smoker

## 2024-01-09 PROCEDURE — 36415 COLL VENOUS BLD VENIPUNCTURE: CPT

## 2024-01-09 PROCEDURE — 80048 BASIC METABOLIC PNL TOTAL CA: CPT

## 2024-01-09 PROCEDURE — G0463: CPT

## 2024-01-09 PROCEDURE — 85027 COMPLETE CBC AUTOMATED: CPT

## 2024-01-11 ENCOUNTER — TRANSCRIPTION ENCOUNTER (OUTPATIENT)
Age: 59
End: 2024-01-11

## 2024-01-12 ENCOUNTER — OUTPATIENT (OUTPATIENT)
Dept: OUTPATIENT SERVICES | Facility: HOSPITAL | Age: 59
LOS: 1 days | End: 2024-01-12
Payer: COMMERCIAL

## 2024-01-12 ENCOUNTER — TRANSCRIPTION ENCOUNTER (OUTPATIENT)
Age: 59
End: 2024-01-12

## 2024-01-12 VITALS
HEART RATE: 74 BPM | TEMPERATURE: 97 F | SYSTOLIC BLOOD PRESSURE: 123 MMHG | DIASTOLIC BLOOD PRESSURE: 75 MMHG | OXYGEN SATURATION: 97 % | RESPIRATION RATE: 16 BRPM

## 2024-01-12 VITALS
SYSTOLIC BLOOD PRESSURE: 145 MMHG | RESPIRATION RATE: 14 BRPM | HEIGHT: 64 IN | TEMPERATURE: 97 F | WEIGHT: 189.6 LBS | OXYGEN SATURATION: 96 % | HEART RATE: 62 BPM | DIASTOLIC BLOOD PRESSURE: 80 MMHG

## 2024-01-12 DIAGNOSIS — Z90.49 ACQUIRED ABSENCE OF OTHER SPECIFIED PARTS OF DIGESTIVE TRACT: Chronic | ICD-10-CM

## 2024-01-12 DIAGNOSIS — M20.42 OTHER HAMMER TOE(S) (ACQUIRED), LEFT FOOT: ICD-10-CM

## 2024-01-12 DIAGNOSIS — Z98.890 OTHER SPECIFIED POSTPROCEDURAL STATES: Chronic | ICD-10-CM

## 2024-01-12 DIAGNOSIS — Z98.84 BARIATRIC SURGERY STATUS: Chronic | ICD-10-CM

## 2024-01-12 DIAGNOSIS — Z98.51 TUBAL LIGATION STATUS: Chronic | ICD-10-CM

## 2024-01-12 DIAGNOSIS — Z98.89 OTHER SPECIFIED POSTPROCEDURAL STATES: Chronic | ICD-10-CM

## 2024-01-12 DIAGNOSIS — M20.5X2 OTHER DEFORMITIES OF TOE(S) (ACQUIRED), LEFT FOOT: ICD-10-CM

## 2024-01-12 DIAGNOSIS — M24.575 CONTRACTURE, LEFT FOOT: ICD-10-CM

## 2024-01-12 DIAGNOSIS — Z90.3 ACQUIRED ABSENCE OF STOMACH [PART OF]: Chronic | ICD-10-CM

## 2024-01-12 DIAGNOSIS — Z90.710 ACQUIRED ABSENCE OF BOTH CERVIX AND UTERUS: Chronic | ICD-10-CM

## 2024-01-12 DIAGNOSIS — M20.12 HALLUX VALGUS (ACQUIRED), LEFT FOOT: ICD-10-CM

## 2024-01-12 PROCEDURE — 73620 X-RAY EXAM OF FOOT: CPT | Mod: 26,LT

## 2024-01-12 DEVICE — SCREW CORTEX S-T 2.7X16MM: Type: IMPLANTABLE DEVICE | Site: LEFT | Status: FUNCTIONAL

## 2024-01-12 DEVICE — SCREW CORT SELF TAP 2MM 16MM: Type: IMPLANTABLE DEVICE | Site: LEFT | Status: FUNCTIONAL

## 2024-01-12 DEVICE — SCREW CORTEX S-T 2.7X18MM: Type: IMPLANTABLE DEVICE | Site: LEFT | Status: FUNCTIONAL

## 2024-01-12 DEVICE — K-WIRE MICROAIRE (SMOOTH) DOUBLE TROCAR 1.1MM X 4": Type: IMPLANTABLE DEVICE | Site: LEFT | Status: FUNCTIONAL

## 2024-01-12 RX ORDER — GABAPENTIN 400 MG/1
0 CAPSULE ORAL
Refills: 0 | DISCHARGE

## 2024-01-12 RX ORDER — METOPROLOL TARTRATE 50 MG
1 TABLET ORAL
Qty: 0 | Refills: 0 | DISCHARGE

## 2024-01-12 RX ORDER — CHOLECALCIFEROL (VITAMIN D3) 125 MCG
1 CAPSULE ORAL
Qty: 0 | Refills: 0 | DISCHARGE

## 2024-01-12 RX ORDER — SODIUM CHLORIDE 9 MG/ML
1000 INJECTION, SOLUTION INTRAVENOUS
Refills: 0 | Status: DISCONTINUED | OUTPATIENT
Start: 2024-01-12 | End: 2024-01-12

## 2024-01-12 RX ORDER — CYCLOBENZAPRINE HYDROCHLORIDE 10 MG/1
1 TABLET, FILM COATED ORAL
Refills: 0 | DISCHARGE

## 2024-01-12 RX ORDER — ONDANSETRON 8 MG/1
4 TABLET, FILM COATED ORAL ONCE
Refills: 0 | Status: DISCONTINUED | OUTPATIENT
Start: 2024-01-12 | End: 2024-01-12

## 2024-01-12 RX ORDER — PREGABALIN 225 MG/1
1 CAPSULE ORAL
Qty: 0 | Refills: 0 | DISCHARGE

## 2024-01-12 RX ORDER — UBIDECARENONE 100 MG
0 CAPSULE ORAL
Refills: 0 | DISCHARGE

## 2024-01-12 RX ORDER — HYDROCHLOROTHIAZIDE 25 MG
1 TABLET ORAL
Qty: 0 | Refills: 0 | DISCHARGE

## 2024-01-12 RX ADMIN — SODIUM CHLORIDE 50 MILLILITER(S): 9 INJECTION, SOLUTION INTRAVENOUS at 10:54

## 2024-01-12 NOTE — BRIEF OPERATIVE NOTE - NSICDXBRIEFPOSTOP_GEN_ALL_CORE_FT
POST-OP DIAGNOSIS:  HV (hallux valgus), left 12-Jan-2024 13:20:22  Nestor Goncalves  Acquired hammertoe of left foot 12-Jan-2024 13:21:08  Nestor Goncalves

## 2024-01-12 NOTE — ASU DISCHARGE PLAN (ADULT/PEDIATRIC) - PROVIDER TOKENS
PROVIDER:[TOKEN:[[id number]],SCHEDULEDAPPT:[01/16/2024]] PROVIDER:[TOKEN:[[id number]],SCHEDULEDAPPT:[01/16/2024]]

## 2024-01-12 NOTE — ASU DISCHARGE PLAN (ADULT/PEDIATRIC) - CARE PROVIDER_API CALL
Nestor Goncalves  Podiatric Medicine and Surgery  [facility] [place] [address]  Phone: [phone]  Fax: [phone]  Scheduled Appointment: 01/16/2024   Nestor Goncalves  Podiatric Medicine and Surgery  [facility] [place] [address]  Phone: [phone]  Fax: [phone]  Scheduled Appointment: 01/16/2024

## 2024-01-12 NOTE — ASU PATIENT PROFILE, ADULT - FALL HARM RISK - UNIVERSAL INTERVENTIONS
Bed in lowest position, wheels locked, appropriate side rails in place/Call bell, personal items and telephone in reach/Instruct patient to call for assistance before getting out of bed or chair/Non-slip footwear when patient is out of bed/[place] to call system/Physically safe environment - no spills, clutter or unnecessary equipment/Purposeful Proactive Rounding/Room/bathroom lighting operational, light cord in reach Bed in lowest position, wheels locked, appropriate side rails in place/Call bell, personal items and telephone in reach/Instruct patient to call for assistance before getting out of bed or chair/Non-slip footwear when patient is out of bed/[place] to call system/Physically safe environment - no spills, clutter or unnecessary equipment/Purposeful Proactive Rounding/Room/bathroom lighting operational, light cord in reach

## 2024-01-12 NOTE — BRIEF OPERATIVE NOTE - NSICDXBRIEFPROCEDURE_GEN_ALL_CORE_FT
PROCEDURES:  Jose M-Coleman bunionectomy of left foot 12-Jan-2024 13:19:20  Nestor Goncalves  Modified Cohen bunionectomy of left foot 12-Jan-2024 13:19:30  Nestor Goncalves  Open flexor tenotomy of left foot 12-Jan-2024 13:19:45  Nestor Goncalves

## 2024-01-12 NOTE — ASU DISCHARGE PLAN (ADULT/PEDIATRIC) - NS MD DC FALL RISK RISK
For information on Fall & Injury Prevention, visit: [email or web address]/[email or web address]-[email or web address]-[email or web address]-[email or web address]-health-[email or web address]-mobility OR  https://www.[facility]/news/fall-prevention-tips-to-avoid-injury OR  https://www.cdc.gov/steadi/patient.html For information on Fall & Injury Prevention, visit: [email or web address]://www.[facility]/news/fall-prevention-protects-and-maintains-health-and-mobility OR  https://www.[facility]/news/fall-prevention-tips-to-avoid-injury OR  https://www.cdc.gov/steadi/patient.html

## 2024-01-12 NOTE — ASU DISCHARGE PLAN (ADULT/PEDIATRIC) - NURSING INSTRUCTIONS
All discharge information reviewed with patient including pain, safety, medication and follow up care . Pt acknowledges understanding of discharge instructions.
Male

## 2024-01-12 NOTE — ASU DISCHARGE PLAN (ADULT/PEDIATRIC) - PROCEDURE
vannessa and akin bunionectomy, modified kwasi, 2nd mTPJ release, flexor tenotomy and capsulotomy, digits 2,3,4,5 left foot

## 2024-01-12 NOTE — BRIEF OPERATIVE NOTE - NSICDXBRIEFPREOP_GEN_ALL_CORE_FT
PRE-OP DIAGNOSIS:  Hallux valgus of left foot 12-Jan-2024 13:19:58  Nestor Goncalves  Hammertoe of left foot 12-Jan-2024 13:20:06  Nestor Goncalves

## 2024-01-12 NOTE — BRIEF OPERATIVE NOTE - TYPE OF ANESTHESIA
MAC [Tender] : tender [Enlarged] : enlarged [Anterior Cervical] : anterior cervical [NL] : warm [de-identified] : enlarged nonerythematous dime sized nodule that is mobile that is positioned right anterior cervical

## 2024-01-12 NOTE — ASU PATIENT PROFILE, ADULT - AS SC BRADEN NUTRITION
Patient is scheduled for his mwv on December 7th. He would like to know if he needs to come in beforehand for any lab work.  Please call him back at [phone].   (4) excellent

## 2024-01-16 PROCEDURE — C1713: CPT

## 2024-01-16 PROCEDURE — 73620 X-RAY EXAM OF FOOT: CPT

## 2024-01-16 PROCEDURE — 28285 REPAIR OF HAMMERTOE: CPT | Mod: T3

## 2024-01-16 PROCEDURE — C1889: CPT

## 2024-01-16 PROCEDURE — 28299 COR HLX VLGS DOUBLE OSTEOT: CPT | Mod: LT

## 2024-01-16 PROCEDURE — 88300 SURGICAL PATH GROSS: CPT

## 2024-01-16 PROCEDURE — 88300 SURGICAL PATH GROSS: CPT | Mod: 26

## 2024-01-18 LAB — SURGICAL PATHOLOGY STUDY: SIGNIFICANT CHANGE UP

## 2024-03-21 ENCOUNTER — APPOINTMENT (OUTPATIENT)
Dept: PAIN MANAGEMENT | Facility: CLINIC | Age: 59
End: 2024-03-21
Payer: COMMERCIAL

## 2024-03-21 VITALS — BODY MASS INDEX: 31.24 KG/M2 | WEIGHT: 183 LBS | HEIGHT: 64 IN

## 2024-03-21 DIAGNOSIS — M47.816 SPONDYLOSIS W/OUT MYELOPATHY OR RADICULOPATHY, LUMBAR REGION: ICD-10-CM

## 2024-03-21 PROCEDURE — 20553 NJX 1/MLT TRIGGER POINTS 3/>: CPT

## 2024-03-21 PROCEDURE — 99214 OFFICE O/P EST MOD 30 MIN: CPT | Mod: 25

## 2024-03-21 PROCEDURE — J3490M: CUSTOM

## 2024-03-28 PROBLEM — M47.816 LUMBAR SPONDYLOSIS: Status: ACTIVE | Noted: 2022-07-11

## 2024-03-28 NOTE — PHYSICAL EXAM
[Flexion] : flexion [Rotation to right] : rotation to right [Rotation to left] : rotation to left [4___] : right triceps 4[unfilled]/5 [Extension] : extension [] : no palpable masses [TWNoteComboBox7] : forward flexion 75 degrees [de-identified] : extension 20 degrees

## 2024-03-28 NOTE — HISTORY OF PRESENT ILLNESS
[Neck] : neck [Lower back] : lower back [4] : 4 [0] : 0 [Dull/Aching] : dull/aching [Tightness] : tightness [Radiating] : radiating [Tingling] : tingling [Intermittent] : intermittent [Rest] : rest [Household chores] : household chores [Meds] : meds [Injection therapy] : injection therapy [Sitting] : sitting [Walking] : walking [Bending forward] : bending forward [8] : 8 [Constant] : constant [Leisure] : leisure [FreeTextEntry1] : 03/21/2024 Follow up today.  Pain is returning in low back.  Would like to schedule RFA.  Had pain neck will try TPI  09/27/2023: follow up today for B/L lumbar RFA on 9/13. 90% relief so far  7/19/23: 75% relief from LESI.  Pain in low back is returning.  Will schedule RFA.  12/05/22: follow up today after Right lumbar RFA  L3,L4,L5 on 11/21/22. She gets intermittent numbness down the leg. Pain down the lateral left leg. Sometimes during the night she gets numbness in the right buttock.   10/19/2022: follow up today.Pain is returning in low back.  Will schedule RFA.  Is on a baby aspirin.  07/25/2022: follow up today after b/l lumbar MBB on 7/11/22. Pt with near complete resolution of her pain following. last block was over a year ago with relief.   06/27/2022: follow up today.  Had 60% relief from NICKIE.  Will start PT.  Will schedule lumbar MBB #2.  4/20/22- Patient here for follow up.  Had shoulder surgery March 1 with Dr. Sanchez.  Now has been having pain in neck and radiating to both hands.  Is on xarelto for a.fib.  5/7/21: follow up today after b/l lumbar MBB on 4/22/21. she had an overall 80-90% improvement which is sustained. has some mild pain on the left lower back and left buttock.  4/16/21: follow up today after LESI L5/S1 on 3/3/21. Pt reports an overall 30%-50% improvement. Pain in the left lower back. Radicular pain much improved. will f/u with Dr. Grimm as her EMG did reveal Cervical radiculopathy, CTS and Cubital tunnel syndrome. She is waiting to get MRI of the left shoulder.   2/17/21- Patient had 70% relief from NICKIE. Pain has returned in lower back. Will repeat LESI. Patient had 85% improvement from LESI. Had 60% relief from NICKIE. Still has some discomfort in neck.  Injections- 6/6/22 NICKIE b/l lumbar MBB (4/22/21, 7/11/22) 11/21/22, 9/13/23- B/L lumbar RFA, [] : no [FreeTextEntry6] : numbness  [FreeTextEntry7] : Left buttock, right thigh

## 2024-03-28 NOTE — ASSESSMENT
[FreeTextEntry1] : Patient presents with axial lumbar pain that has not responded to  3 months of conservative therapy including physical therapy or NSAID therapy.  The pain is interfering with activities of daily living and functionality.   There is no radicular pain.   The pain is exacerbated by facet loading.  Positive Kemps maneuver which is defined by pain reproduction with extension and rotation of the lumbar spine to the affected side.  The patient has not had a vertebral fusion at the levels of the proposed treatment.  There is no unexplained neurologic deficit.  There is no history of systemic infection, unstable medical condition, bleeding tendency, or local infection.  The injection is being performed to diagnose the facet joint as the source of the individual's pain.  B/L lumbar RFA-  NICKIE- Will call  The risks, benefits, contents and alternatives to injection were explained in full to the patient.  Risks outlined include but are not limited to infection, sepsis, bleeding, scarring, skin discoloration, temporary increase in pain, syncopal episode, failure to resolve symptoms, allergic reaction, flare reaction, permanent white skin discoloration, symptom recurrence, and elevation of blood sugar in diabetics.  Patient understood the risks.  All questions were answered.  After discussion of options, patient requested an injection.  Oral informed consent was obtained and sterile prep was done of the injection site.  Sterile technique was used to introduce the mixture. The mixture consisted of 3 cc 1% lidocaine, 3cc 0.25% marcaine, and 10mg of kenalog.  Patient tolerated the procedure well.  Patient advised to ice the injection site this evening.  Signs and symptoms of infection reviewed and patient advised to call immediately for redness, fevers, and/or chills.

## 2024-04-02 ENCOUNTER — OFFICE (OUTPATIENT)
Dept: URBAN - METROPOLITAN AREA CLINIC 63 | Facility: CLINIC | Age: 59
Setting detail: OPHTHALMOLOGY
End: 2024-04-02
Payer: COMMERCIAL

## 2024-04-02 DIAGNOSIS — H40.013: ICD-10-CM

## 2024-04-02 DIAGNOSIS — H01.005: ICD-10-CM

## 2024-04-02 DIAGNOSIS — H01.002: ICD-10-CM

## 2024-04-02 DIAGNOSIS — H25.13: ICD-10-CM

## 2024-04-02 DIAGNOSIS — D31.30: ICD-10-CM

## 2024-04-02 PROCEDURE — 92133 CPTRZD OPH DX IMG PST SGM ON: CPT | Performed by: OPHTHALMOLOGY

## 2024-04-02 PROCEDURE — 92014 COMPRE OPH EXAM EST PT 1/>: CPT | Performed by: OPHTHALMOLOGY

## 2024-04-02 ASSESSMENT — LID EXAM ASSESSMENTS
OD_BLEPHARITIS: RLL 1+
OS_BLEPHARITIS: LLL 1+

## 2024-04-05 ENCOUNTER — APPOINTMENT (OUTPATIENT)
Age: 59
End: 2024-04-05
Payer: COMMERCIAL

## 2024-04-05 PROCEDURE — 64636 DESTROY L/S FACET JNT ADDL: CPT | Mod: 59,RT

## 2024-04-05 PROCEDURE — 64635 DESTROY LUMB/SAC FACET JNT: CPT | Mod: LT

## 2024-04-11 NOTE — ASU PATIENT PROFILE, ADULT - HEALTHCARE INFORMATION NEEDED, PROFILE
Patient is requesting a return call from [place] in regards to questions she has regarding surgery, and other things she forgot to mention. She says she has left several messages with no call back.   declines

## 2024-05-08 ENCOUNTER — APPOINTMENT (OUTPATIENT)
Dept: PAIN MANAGEMENT | Facility: CLINIC | Age: 59
End: 2024-05-08
Payer: COMMERCIAL

## 2024-05-08 PROCEDURE — 62321 NJX INTERLAMINAR CRV/THRC: CPT

## 2024-05-08 NOTE — PROCEDURE
[FreeTextEntry3] : Date of Service: 05/08/2024   Account: [id number]   Patient: NICKOLAS TORRES   YOB: 1965   Age: 58 year     Surgeon:                                                      Laly Ngo M.D.   Pre-Operative Diagnosis:                          Cervical Radiculopathy (M54.12)   Post Operative Diagnosis:                        Cervical Radiculopathy (M54.12)   Procedure:                                                  Interlaminar cervical epidural steroid injection (C7-T1) under fluoroscopic guidance   Anesthesia:                                                 MAC     This procedure was carried out using fluoroscopic guidance.  The risks and benefits of the procedure were discussed extensively with the patient.  The consent of the patient was obtained and the following procedure was performed.   The patient was placed in the prone position using a thoracic and chin support.  The cervical area was prepped and draped in a sterile fashion.  The fluoroscope visualized the C7-T1 interspace using slight cephalad-caudad angulation and this area was marked.  Using sterile technique the superficial skin was anesthetized with 1% Lidocaine without epinephrine.  A 18 gauge Tuohoy needle was advanced under fluoroscopy using [id number] technique until ligament was engaged.  The stilette was then removed and a column of preservative free normal saline flushed throught the tuohoy needle and left with a concave fluid level above the butterfly portion of the tuohoy needle.  The needle was then advanced under fluoroscopic guidance until the column of saline disappeared.  Lateral view confirmed final needle tip placement in the epidural space.  After negative aspiration for heme and CSF, 1 cc of Omnipaque confirmed good cervical epiduragram.   Cervical epidurogram showed no evidence of intrathecal or intravascular flow, and good bilateral epidural flow from C3 to T2 levels.  An injectate of 6 cc of preservative free normal saline plus 12 mg of betamethasone was then injected into the epidural space. The needle was subsequently removed and pressure was applied.  Anesthesia was present throughout the procedure.   The patient tolerated the procedure well and was instructed to contact me immediately if there were any problems.   Laly Ngo M.D.

## 2024-06-04 ENCOUNTER — APPOINTMENT (OUTPATIENT)
Dept: PAIN MANAGEMENT | Facility: CLINIC | Age: 59
End: 2024-06-04
Payer: COMMERCIAL

## 2024-06-04 DIAGNOSIS — M54.12 RADICULOPATHY, CERVICAL REGION: ICD-10-CM

## 2024-06-04 DIAGNOSIS — M54.16 RADICULOPATHY, LUMBAR REGION: ICD-10-CM

## 2024-06-04 PROCEDURE — 99213 OFFICE O/P EST LOW 20 MIN: CPT

## 2024-06-04 RX ORDER — GABAPENTIN 400 MG/1
400 CAPSULE ORAL
Qty: 30 | Refills: 2 | Status: ACTIVE | COMMUNITY
Start: 2022-07-12 | End: 1900-01-01

## 2024-06-04 RX ORDER — CYCLOBENZAPRINE HYDROCHLORIDE 5 MG/1
5 TABLET, FILM COATED ORAL
Qty: 60 | Refills: 1 | Status: ACTIVE | COMMUNITY
Start: 2023-09-27 | End: 1900-01-01

## 2024-06-04 NOTE — PHYSICAL EXAM
[Flexion] : flexion [Rotation to left] : rotation to left [Rotation to right] : rotation to right [4___] : right triceps 4[unfilled]/5 [Extension] : extension [] : no palpable masses [TWNoteComboBox7] : forward flexion 75 degrees [de-identified] : extension 20 degrees

## 2024-06-04 NOTE — HISTORY OF PRESENT ILLNESS
[Neck] : neck [Lower back] : lower back [8] : 8 [Dull/Aching] : dull/aching [Radiating] : radiating [Tightness] : tightness [Tingling] : tingling [Constant] : constant [Household chores] : household chores [Leisure] : leisure [Rest] : rest [Meds] : meds [Injection therapy] : injection therapy [Sitting] : sitting [Walking] : walking [Bending forward] : bending forward [Sleep] : sleep [FreeTextEntry1] : 06/04/2024: follow up today for NICKIE on 5/8/24 with 75% relief and b/l RFA 4/5/24 with 50% relief. The pain in the neck is started to recur. She has an appt with Dr. Grimm.  She does not have Graves disease per her endocrinologist.  taking gabapentin 400mg qhs and cyclobenzaprine PRN.   03/21/2024 Follow up today.  Pain is returning in low back.  Would like to schedule RFA.  Had pain neck will try TPI  09/27/2023: follow up today for B/L lumbar RFA on 9/13. 90% relief so far  7/19/23: 75% relief from LESI.  Pain in low back is returning.  Will schedule RFA.  12/05/22: follow up today after Right lumbar RFA  L3,L4,L5 on 11/21/22. She gets intermittent numbness down the leg. Pain down the lateral left leg. Sometimes during the night she gets numbness in the right buttock.   10/19/2022: follow up today.Pain is returning in low back.  Will schedule RFA.  Is on a baby aspirin.  07/25/2022: follow up today after b/l lumbar MBB on 7/11/22. Pt with near complete resolution of her pain following. last block was over a year ago with relief.   06/27/2022: follow up today.  Had 60% relief from NICKIE.  Will start PT.  Will schedule lumbar MBB #2.  4/20/22- Patient here for follow up.  Had shoulder surgery March 1 with Dr. Sanchez.  Now has been having pain in neck and radiating to both hands.  Is on xarelto for a.fib.  5/7/21: follow up today after b/l lumbar MBB on 4/22/21. she had an overall 80-90% improvement which is sustained. has some mild pain on the left lower back and left buttock.  4/16/21: follow up today after LESI L5/S1 on 3/3/21. Pt reports an overall 30%-50% improvement. Pain in the left lower back. Radicular pain much improved. will f/u with Dr. Grimm as her EMG did reveal Cervical radiculopathy, CTS and Cubital tunnel syndrome. She is waiting to get MRI of the left shoulder.   2/17/21- Patient had 70% relief from NICKIE. Pain has returned in lower back. Will repeat LESI. Patient had 85% improvement from LESI. Had 60% relief from NICKIE. Still has some discomfort in neck.  Injections- 6/6/22 NICKIE b/l lumbar MBB (4/22/21, 7/11/22) 11/21/22, 9/13/23- B/L lumbar RFA, [] : no [FreeTextEntry6] : numbness  [FreeTextEntry7] : Left buttock, right thigh

## 2024-06-04 NOTE — ASSESSMENT
[FreeTextEntry1] : After discussing various treatment options with the patient including but not limited to oral medications, physical therapy, exercise, modalities as well as interventional spinal injections, we have decided with the following plan:  1) Patient is stable on current regimen of medication. Denies any side of effects. Risks and benefits discussed. Functional improvement noted. At least >30% improvement of pain. Will renew patient medication. Patient is stable on current regimen of medication. Denies any side of effects. Risks and benefits discussed. Functional improvement noted. At least >30% improvement of pain. Will renew patient medication. The patient is stable on current medication with analgesia and without notable side effects or any obvious aberrant behaviors exhibited.   There is clinically meaningful improvement in pain and function that outweighs risks to patient safety.  I have discussed risks and realistic benefits of therapy and patient and clinician responsibilities for managing therapy.   Will renew medication today.  Pt informed of risk of Flexeril with diagnosis of afb. Denies any side effects and wants to continue.

## 2024-07-09 ENCOUNTER — APPOINTMENT (OUTPATIENT)
Dept: ORTHOPEDIC SURGERY | Facility: CLINIC | Age: 59
End: 2024-07-09
Payer: COMMERCIAL

## 2024-07-09 VITALS — WEIGHT: 183 LBS | HEIGHT: 64 IN | BODY MASS INDEX: 31.24 KG/M2

## 2024-07-09 DIAGNOSIS — M54.16 RADICULOPATHY, LUMBAR REGION: ICD-10-CM

## 2024-07-09 DIAGNOSIS — M54.12 RADICULOPATHY, CERVICAL REGION: ICD-10-CM

## 2024-07-09 PROCEDURE — 99214 OFFICE O/P EST MOD 30 MIN: CPT

## 2024-07-10 ENCOUNTER — RESULT REVIEW (OUTPATIENT)
Age: 59
End: 2024-07-10

## 2024-07-19 ENCOUNTER — OUTPATIENT (OUTPATIENT)
Dept: OUTPATIENT SERVICES | Facility: HOSPITAL | Age: 59
LOS: 1 days | End: 2024-07-19
Payer: COMMERCIAL

## 2024-07-19 VITALS
WEIGHT: 164.02 LBS | RESPIRATION RATE: 16 BRPM | SYSTOLIC BLOOD PRESSURE: 121 MMHG | DIASTOLIC BLOOD PRESSURE: 80 MMHG | HEIGHT: 65 IN | TEMPERATURE: 98 F | OXYGEN SATURATION: 98 % | HEART RATE: 72 BPM

## 2024-07-19 DIAGNOSIS — Z47.2 ENCOUNTER FOR REMOVAL OF INTERNAL FIXATION DEVICE: ICD-10-CM

## 2024-07-19 DIAGNOSIS — Z98.890 OTHER SPECIFIED POSTPROCEDURAL STATES: Chronic | ICD-10-CM

## 2024-07-19 DIAGNOSIS — Z98.84 BARIATRIC SURGERY STATUS: Chronic | ICD-10-CM

## 2024-07-19 DIAGNOSIS — Z98.89 OTHER SPECIFIED POSTPROCEDURAL STATES: Chronic | ICD-10-CM

## 2024-07-19 DIAGNOSIS — Z01.818 ENCOUNTER FOR OTHER PREPROCEDURAL EXAMINATION: ICD-10-CM

## 2024-07-19 DIAGNOSIS — I10 ESSENTIAL (PRIMARY) HYPERTENSION: ICD-10-CM

## 2024-07-19 DIAGNOSIS — Z90.710 ACQUIRED ABSENCE OF BOTH CERVIX AND UTERUS: Chronic | ICD-10-CM

## 2024-07-19 DIAGNOSIS — Z90.3 ACQUIRED ABSENCE OF STOMACH [PART OF]: Chronic | ICD-10-CM

## 2024-07-19 DIAGNOSIS — M65.811 OTHER SYNOVITIS AND TENOSYNOVITIS, RIGHT SHOULDER: ICD-10-CM

## 2024-07-19 DIAGNOSIS — Z90.49 ACQUIRED ABSENCE OF OTHER SPECIFIED PARTS OF DIGESTIVE TRACT: Chronic | ICD-10-CM

## 2024-07-19 DIAGNOSIS — M65.872 OTHER SYNOVITIS AND TENOSYNOVITIS, LEFT ANKLE AND FOOT: ICD-10-CM

## 2024-07-19 DIAGNOSIS — Z98.51 TUBAL LIGATION STATUS: Chronic | ICD-10-CM

## 2024-07-19 LAB
ANION GAP SERPL CALC-SCNC: 8 MMOL/L — SIGNIFICANT CHANGE UP (ref 5–17)
BUN SERPL-MCNC: 10 MG/DL — SIGNIFICANT CHANGE UP (ref 7–23)
CALCIUM SERPL-MCNC: 9.8 MG/DL — SIGNIFICANT CHANGE UP (ref 8.4–10.5)
CHLORIDE SERPL-SCNC: 104 MMOL/L — SIGNIFICANT CHANGE UP (ref 96–108)
CO2 SERPL-SCNC: 30 MMOL/L — SIGNIFICANT CHANGE UP (ref 22–31)
CREAT SERPL-MCNC: 1.03 MG/DL — SIGNIFICANT CHANGE UP (ref 0.5–1.3)
EGFR: 63 ML/MIN/1.73M2 — SIGNIFICANT CHANGE UP
GLUCOSE SERPL-MCNC: 71 MG/DL — SIGNIFICANT CHANGE UP (ref 70–99)
HCT VFR BLD CALC: 41 % — SIGNIFICANT CHANGE UP (ref 34.5–45)
HGB BLD-MCNC: 13.1 G/DL — SIGNIFICANT CHANGE UP (ref 11.5–15.5)
MCHC RBC-ENTMCNC: 26.9 PG — LOW (ref 27–34)
MCHC RBC-ENTMCNC: 32 GM/DL — SIGNIFICANT CHANGE UP (ref 32–36)
MCV RBC AUTO: 84.2 FL — SIGNIFICANT CHANGE UP (ref 80–100)
NRBC # BLD: 0 /100 WBCS — SIGNIFICANT CHANGE UP (ref 0–0)
PLATELET # BLD AUTO: 315 K/UL — SIGNIFICANT CHANGE UP (ref 150–400)
POTASSIUM SERPL-MCNC: 3.3 MMOL/L — LOW (ref 3.5–5.3)
POTASSIUM SERPL-SCNC: 3.3 MMOL/L — LOW (ref 3.5–5.3)
RBC # BLD: 4.87 M/UL — SIGNIFICANT CHANGE UP (ref 3.8–5.2)
RBC # FLD: 14.1 % — SIGNIFICANT CHANGE UP (ref 10.3–14.5)
SODIUM SERPL-SCNC: 142 MMOL/L — SIGNIFICANT CHANGE UP (ref 135–145)
WBC # BLD: 2.8 K/UL — LOW (ref 3.8–10.5)
WBC # FLD AUTO: 2.8 K/UL — LOW (ref 3.8–10.5)

## 2024-07-19 PROCEDURE — G0463: CPT

## 2024-07-19 PROCEDURE — 85027 COMPLETE CBC AUTOMATED: CPT

## 2024-07-19 PROCEDURE — 36415 COLL VENOUS BLD VENIPUNCTURE: CPT

## 2024-07-19 PROCEDURE — 80048 BASIC METABOLIC PNL TOTAL CA: CPT

## 2024-07-19 RX ORDER — POTASSIUM CHLORIDE 600 MG/1
0 TABLET, FILM COATED, EXTENDED RELEASE ORAL
Refills: 0 | DISCHARGE

## 2024-07-19 NOTE — H&P PST ADULT - HISTORY OF PRESENT ILLNESS
Patient is a 58 year old F presents for preoperative testing for removal of hardware in b/l feet.  Pt reports having b/l foot pain.  Denies any acute symptoms at this time. Patient reports feeling well overall.

## 2024-07-19 NOTE — H&P PST ADULT - PROBLEM SELECTOR PLAN 1
Patient provided with pre-operative instructions and verbalized understanding.  Patient will be NPO on day of surgery. Patient will stop NSAIDs, aspirin, herbal supplements or vitamins 1 week prior to surgery.  Chlorhexidine wash provided with instructions.      Zepbound to be held 7/27

## 2024-07-19 NOTE — H&P PST ADULT - OPHTHALMOLOGIC
Wash wound once or twice a day with soap and water and dry it afterwards. You can apply antibiotic ointment afterwards. Start the antibiotic today and take it as prescribed. It should be waiting for you at [address] [place] [address] today. Apply warm compresses or soak in hot water for 15 to 20 minutes, 4 times a day until wound is fully healed. If the knee does not look much better by Monday please call for an appointment with Dr. Alanna Castellanos.    If it looks much better by Monday just have the family doctor see Ness Bell in follow-up
details…

## 2024-07-19 NOTE — H&P PST ADULT - COMMENTS
No Denies h/o or family h/o DVT, PE  Denies bleeding or clotting disorders  Denies dentures/partials, loose teeth/caps 168/91 electronic

## 2024-08-05 ENCOUNTER — OUTPATIENT (OUTPATIENT)
Dept: OUTPATIENT SERVICES | Facility: HOSPITAL | Age: 59
LOS: 1 days | Discharge: HOME | End: 2024-08-05

## 2024-08-05 DIAGNOSIS — Z98.890 OTHER SPECIFIED POSTPROCEDURAL STATES: Chronic | ICD-10-CM

## 2024-08-05 DIAGNOSIS — Z98.84 BARIATRIC SURGERY STATUS: Chronic | ICD-10-CM

## 2024-08-05 DIAGNOSIS — Z90.710 ACQUIRED ABSENCE OF BOTH CERVIX AND UTERUS: Chronic | ICD-10-CM

## 2024-08-05 DIAGNOSIS — Z98.89 OTHER SPECIFIED POSTPROCEDURAL STATES: Chronic | ICD-10-CM

## 2024-08-05 DIAGNOSIS — D64.9 ANEMIA, UNSPECIFIED: ICD-10-CM

## 2024-08-05 DIAGNOSIS — Z90.49 ACQUIRED ABSENCE OF OTHER SPECIFIED PARTS OF DIGESTIVE TRACT: Chronic | ICD-10-CM

## 2024-08-05 DIAGNOSIS — Z90.3 ACQUIRED ABSENCE OF STOMACH [PART OF]: Chronic | ICD-10-CM

## 2024-08-05 DIAGNOSIS — Z98.51 TUBAL LIGATION STATUS: Chronic | ICD-10-CM

## 2024-08-06 ENCOUNTER — APPOINTMENT (OUTPATIENT)
Dept: ORTHOPEDIC SURGERY | Facility: CLINIC | Age: 59
End: 2024-08-06

## 2024-08-07 ENCOUNTER — APPOINTMENT (OUTPATIENT)
Dept: ORTHOPEDIC SURGERY | Facility: CLINIC | Age: 59
End: 2024-08-07

## 2024-08-07 ENCOUNTER — RESULT REVIEW (OUTPATIENT)
Age: 59
End: 2024-08-07

## 2024-08-07 ENCOUNTER — APPOINTMENT (OUTPATIENT)
Dept: HEMATOLOGY ONCOLOGY | Facility: CLINIC | Age: 59
End: 2024-08-07

## 2024-08-07 PROBLEM — D72.819 LEUKOPENIA: Status: ACTIVE | Noted: 2024-08-07

## 2024-08-07 LAB
BASOPHILS # BLD AUTO: 0.1 K/UL — SIGNIFICANT CHANGE UP (ref 0–0.2)
BASOPHILS NFR BLD AUTO: 2.6 % — HIGH (ref 0–2)
EOSINOPHIL # BLD AUTO: 0.1 K/UL — SIGNIFICANT CHANGE UP (ref 0–0.5)
EOSINOPHIL NFR BLD AUTO: 2.2 % — SIGNIFICANT CHANGE UP (ref 0–6)
HCT VFR BLD CALC: 42.3 % — SIGNIFICANT CHANGE UP (ref 34.5–45)
HGB BLD-MCNC: 12.9 G/DL — SIGNIFICANT CHANGE UP (ref 11.5–15.5)
LYMPHOCYTES # BLD AUTO: 1.6 K/UL — SIGNIFICANT CHANGE UP (ref 1–3.3)
LYMPHOCYTES # BLD AUTO: 37.8 % — SIGNIFICANT CHANGE UP (ref 13–44)
MCHC RBC-ENTMCNC: 26.6 PG — LOW (ref 27–34)
MCHC RBC-ENTMCNC: 30.5 G/DL — LOW (ref 32–36)
MCV RBC AUTO: 87 FL — SIGNIFICANT CHANGE UP (ref 80–100)
MONOCYTES # BLD AUTO: 0.3 K/UL — SIGNIFICANT CHANGE UP (ref 0–0.9)
MONOCYTES NFR BLD AUTO: 6.1 % — SIGNIFICANT CHANGE UP (ref 2–14)
NEUTROPHILS # BLD AUTO: 2.2 K/UL — SIGNIFICANT CHANGE UP (ref 1.8–7.4)
NEUTROPHILS NFR BLD AUTO: 51.4 % — SIGNIFICANT CHANGE UP (ref 43–77)
PLATELET # BLD AUTO: 296 K/UL — SIGNIFICANT CHANGE UP (ref 150–400)
RBC # BLD: 4.87 M/UL — SIGNIFICANT CHANGE UP (ref 3.8–5.2)
RBC # FLD: 13 % — SIGNIFICANT CHANGE UP (ref 10.3–14.5)
WBC # BLD: 4.3 K/UL — SIGNIFICANT CHANGE UP (ref 3.8–10.5)
WBC # FLD AUTO: 4.3 K/UL — SIGNIFICANT CHANGE UP (ref 3.8–10.5)

## 2024-08-07 PROCEDURE — 99215 OFFICE O/P EST HI 40 MIN: CPT

## 2024-08-07 PROCEDURE — 99203 OFFICE O/P NEW LOW 30 MIN: CPT

## 2024-08-07 NOTE — IMAGING
[de-identified] : C spine  Inspection: No rash or ecchymosis   Palpation: b/l trapezial and R rhomboid tenderness  ROM: diminished all planes  Strength: 4/5 R deltoid and b/l , abductors. Otherwise 5/5  Sensation: diminished sensation to light touch in digits 3-4 of b/l hands    L spine  Palpation: Midline lumbar/thoracic tenderness. B/l lumbar paraspinal TTP and b/l SI joint tenderness to palpation. No greater trochanter tenderness to palpation.  ROM: diminished all planes  Strength: 4/5 bilateral hip flexors, knee extensors, ankle dorsiflexors, EHL, ankle plantarflexors. Limited due to pain  Sensation: Sensation present to light touch bilateral L2-S1 distributions   Provocative maneuvers: equivocal bilateral straight leg raise

## 2024-08-07 NOTE — HISTORY OF PRESENT ILLNESS
[de-identified] : Patient is a 58 F referred for surgical clearance.  Patient went for pre-surgical testing for pin removal with [facility]. Labs notable for WBC 2.80K, no differential. No other cytopenias noted. Denies fever, weight loss, night sweats, chills, fatigue, recurrent infections/hospitalizations. No new medications, travel.

## 2024-08-07 NOTE — ASSESSMENT
[FreeTextEntry1] : Has failed extensive conservative measures including PT, medications, Tisha, is interested in more definitive interventions. Indicating for ACDF C3-C6 Anterior Cervical Discectomy and Fusion- We've discussed the surgery details including instrumentation and grafting options (local, allograft, ICBG, and biologics) as well as potential for complications including but not limited to pain, scar, bleeding, and infection. There is also a possibility for hardware complication such as malposition of hardware, hardware loosening, pullout, failure or fracture of bone, adjacent segment disease, pseudarthrosis, and need for future surgery. Finally, we discussed potential for injury to nerves, the spinal cord either transient or permanent, CSF leak, damage to blood vessels, paralysis, blindness, stroke, dysphagia, dysphonia, need for transfusion, and medical complications.  The patient verbalized understanding and all questions were answered.   Gabapentin- Patient advised of sedating effects, instructed not to drive, operate machinery, or take with other sedating medications. Advised of need to taper on/off medication and risk of abruptly stopping gabapentin.  Muscle Relaxants- To help decrease muscle spasm and assist with pain relief. Advised of sedating effects and instructed not to drive, operate heavy machinery, or take with other sedating medications.

## 2024-08-07 NOTE — ASSESSMENT
[FreeTextEntry1] : Patient is a 58 F referred for surgical clearance.  Patient went for pre-surgical testing for pin removal with [facility]. Labs notable for WBC 2.80K, no differential. No other cytopenias noted. Denies fever, weight loss, night sweats, chills, fatigue, recurrent infections/hospitalizations. No new medications, travel.   #Leukopenia - Repeat WBC 4.3K with normal differential - DDx idiopathic, autoimmune, Beltran Null, nutritional def - No constitutional symptoms, unremarkable exam - Sending CMP, LDH, iron studies, B12/folate, copper, Flow cytometry - Patient cleared for upcoming surgery from Hematology standpoint
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
## 2024-08-07 NOTE — PHYSICAL EXAM
[Normal] : affect appropriate Doxycycline Pregnancy And Lactation Text: This medication is Pregnancy Category D and not consider safe during pregnancy. It is also excreted in breast milk but is considered safe for shorter treatment courses.

## 2024-08-08 ENCOUNTER — TRANSCRIPTION ENCOUNTER (OUTPATIENT)
Age: 59
End: 2024-08-08

## 2024-08-08 NOTE — ASU PATIENT PROFILE, ADULT - NSICDXPASTMEDICALHX_GEN_ALL_CORE_FT
PAST MEDICAL HISTORY:  2019 novel coronavirus disease (COVID-19) 12/3/2021    Asthma triggered by bronchitis; last episode 2013    Calcaneal spur, right foot     COVID-19 vaccine series completed 12/3/21 + , has tested negative since then she states.    Herniated cervical disc     Herpes simplex type 2 infection     History of atrial fibrillation     History of breast cancer 2006 right    Hypertension     Implantable loop recorder present     Kidney stone     Low folic acid resolved    Lumbar herniated disc     Obesity (BMI 30-39.9)     Osteoarthritis     SVT (supraventricular tachycardia) ablation 1/2022    Thyromegaly

## 2024-08-08 NOTE — ASU PATIENT PROFILE, ADULT - FALL HARM RISK - RISK INTERVENTIONS

## 2024-08-08 NOTE — ASU PATIENT PROFILE, ADULT - NSICDXPASTSURGICALHX_GEN_ALL_CORE_FT
PAST SURGICAL HISTORY:  H/O Achilles tendon repair right    H/O gastric sleeve 5/2018    History of bilateral carpal tunnel release     History of bunionectomy of right great toe bilaterally screws    History of cardiac radiofrequency ablation     History of lithotripsy     History of removal of laparoscopic gastric banding device     History of repair of right rotator cuff     LAP-BAND surgery status 05/2005    S/P breast reconstruction, bilateral 2014 scarring from RT    S/P cholecystectomy 12/1998    S/P hernia repair 2/2017 hiatal and umbilical mesh    S/P hysterectomy 2014    S/P lumpectomy, right breast 11/2006 ( Radiation x 6 weeks )    S/P tubal ligation 1998     0

## 2024-08-09 ENCOUNTER — OUTPATIENT (OUTPATIENT)
Dept: OUTPATIENT SERVICES | Facility: HOSPITAL | Age: 59
LOS: 1 days | End: 2024-08-09
Payer: COMMERCIAL

## 2024-08-09 ENCOUNTER — TRANSCRIPTION ENCOUNTER (OUTPATIENT)
Age: 59
End: 2024-08-09

## 2024-08-09 VITALS
SYSTOLIC BLOOD PRESSURE: 124 MMHG | TEMPERATURE: 98 F | OXYGEN SATURATION: 98 % | HEART RATE: 72 BPM | DIASTOLIC BLOOD PRESSURE: 74 MMHG | RESPIRATION RATE: 15 BRPM

## 2024-08-09 VITALS
DIASTOLIC BLOOD PRESSURE: 83 MMHG | HEART RATE: 71 BPM | WEIGHT: 164.02 LBS | RESPIRATION RATE: 14 BRPM | OXYGEN SATURATION: 96 % | TEMPERATURE: 98 F | SYSTOLIC BLOOD PRESSURE: 130 MMHG | HEIGHT: 65 IN

## 2024-08-09 DIAGNOSIS — Z98.890 OTHER SPECIFIED POSTPROCEDURAL STATES: Chronic | ICD-10-CM

## 2024-08-09 DIAGNOSIS — Z90.3 ACQUIRED ABSENCE OF STOMACH [PART OF]: Chronic | ICD-10-CM

## 2024-08-09 DIAGNOSIS — Z98.84 BARIATRIC SURGERY STATUS: Chronic | ICD-10-CM

## 2024-08-09 DIAGNOSIS — M65.811 OTHER SYNOVITIS AND TENOSYNOVITIS, RIGHT SHOULDER: ICD-10-CM

## 2024-08-09 DIAGNOSIS — Z47.2 ENCOUNTER FOR REMOVAL OF INTERNAL FIXATION DEVICE: ICD-10-CM

## 2024-08-09 DIAGNOSIS — Z98.89 OTHER SPECIFIED POSTPROCEDURAL STATES: Chronic | ICD-10-CM

## 2024-08-09 DIAGNOSIS — Z98.51 TUBAL LIGATION STATUS: Chronic | ICD-10-CM

## 2024-08-09 DIAGNOSIS — M65.872 OTHER SYNOVITIS AND TENOSYNOVITIS, LEFT ANKLE AND FOOT: ICD-10-CM

## 2024-08-09 DIAGNOSIS — Z98.89 UNDEFINED: Chronic | ICD-10-CM

## 2024-08-09 DIAGNOSIS — Z90.710 ACQUIRED ABSENCE OF BOTH CERVIX AND UTERUS: Chronic | ICD-10-CM

## 2024-08-09 DIAGNOSIS — Z90.49 ACQUIRED ABSENCE OF OTHER SPECIFIED PARTS OF DIGESTIVE TRACT: Chronic | ICD-10-CM

## 2024-08-09 PROCEDURE — 88305 TISSUE EXAM BY PATHOLOGIST: CPT | Mod: 26

## 2024-08-09 PROCEDURE — 28072 REMOVAL OF FOOT JOINT LINING: CPT | Mod: T5

## 2024-08-09 PROCEDURE — 88300 SURGICAL PATH GROSS: CPT

## 2024-08-09 PROCEDURE — 88305 TISSUE EXAM BY PATHOLOGIST: CPT

## 2024-08-09 PROCEDURE — 20680 REMOVAL OF IMPLANT DEEP: CPT

## 2024-08-09 PROCEDURE — 73620 X-RAY EXAM OF FOOT: CPT | Mod: 26,50

## 2024-08-09 PROCEDURE — 73620 X-RAY EXAM OF FOOT: CPT

## 2024-08-09 PROCEDURE — 88300 SURGICAL PATH GROSS: CPT | Mod: 26,59

## 2024-08-09 RX ORDER — HYDROMORPHONE HCL IN 0.9% NACL 0.2 MG/ML
0.5 PLASTIC BAG, INJECTION (ML) INTRAVENOUS
Refills: 0 | Status: DISCONTINUED | OUTPATIENT
Start: 2024-08-09 | End: 2024-08-12

## 2024-08-09 RX ORDER — ONDANSETRON HCL/PF 4 MG/2 ML
4 VIAL (ML) INJECTION ONCE
Refills: 0 | Status: DISCONTINUED | OUTPATIENT
Start: 2024-08-09 | End: 2024-08-12

## 2024-08-09 RX ORDER — DEXTROSE MONOHYDRATE, SODIUM CHLORIDE, SODIUM LACTATE, CALCIUM CHLORIDE, MAGNESIUM CHLORIDE 1.5; 538; 448; 18.4; 5.08 G/100ML; MG/100ML; MG/100ML; MG/100ML; MG/100ML
1000 SOLUTION INTRAPERITONEAL
Refills: 0 | Status: DISCONTINUED | OUTPATIENT
Start: 2024-08-09 | End: 2024-08-12

## 2024-08-09 RX ORDER — DEXTROSE MONOHYDRATE, SODIUM CHLORIDE, SODIUM LACTATE, CALCIUM CHLORIDE, MAGNESIUM CHLORIDE 1.5; 538; 448; 18.4; 5.08 G/100ML; MG/100ML; MG/100ML; MG/100ML; MG/100ML
1000 SOLUTION INTRAPERITONEAL
Refills: 0 | Status: DISCONTINUED | OUTPATIENT
Start: 2024-08-09 | End: 2024-08-09

## 2024-08-09 RX ORDER — ASPIRIN 325 MG/1
1 TABLET, FILM COATED ORAL
Refills: 0 | DISCHARGE

## 2024-08-09 RX ADMIN — Medication 0.5 MILLIGRAM(S): at 09:35

## 2024-08-09 RX ADMIN — Medication 0.5 MILLIGRAM(S): at 08:58

## 2024-08-09 RX ADMIN — Medication 0.5 MILLIGRAM(S): at 09:20

## 2024-08-09 RX ADMIN — Medication 0.5 MILLIGRAM(S): at 09:15

## 2024-08-09 NOTE — ASU DISCHARGE PLAN (ADULT/PEDIATRIC) - NS MD DC FALL RISK RISK
For information on Fall & Injury Prevention, visit: [email or web address] OR  [email or web address] OR  https://www.cdc.gov/steadi/patient.html

## 2024-08-09 NOTE — ASU DISCHARGE PLAN (ADULT/PEDIATRIC) - CARE PROVIDER_API CALL
Nestor Goncalves  Podiatric Medicine and Surgery  [address] [place] [address]  Phone: [phone]  Fax: [phone]  Scheduled Appointment: 08/13/2024

## 2024-08-16 LAB — SURGICAL PATHOLOGY STUDY: SIGNIFICANT CHANGE UP

## 2024-09-09 ENCOUNTER — APPOINTMENT (OUTPATIENT)
Dept: HEMATOLOGY ONCOLOGY | Facility: CLINIC | Age: 59
End: 2024-09-09

## 2024-09-10 ENCOUNTER — RESULT REVIEW (OUTPATIENT)
Age: 59
End: 2024-09-10

## 2024-09-10 ENCOUNTER — APPOINTMENT (OUTPATIENT)
Dept: HEMATOLOGY ONCOLOGY | Facility: CLINIC | Age: 59
End: 2024-09-10
Payer: COMMERCIAL

## 2024-09-10 VITALS
DIASTOLIC BLOOD PRESSURE: 77 MMHG | TEMPERATURE: 96.4 F | OXYGEN SATURATION: 98 % | BODY MASS INDEX: 25.27 KG/M2 | SYSTOLIC BLOOD PRESSURE: 115 MMHG | WEIGHT: 148 LBS | HEART RATE: 69 BPM | HEIGHT: 64 IN

## 2024-09-10 DIAGNOSIS — D72.819 DECREASED WHITE BLOOD CELL COUNT, UNSPECIFIED: ICD-10-CM

## 2024-09-10 PROCEDURE — 99214 OFFICE O/P EST MOD 30 MIN: CPT

## 2024-09-10 NOTE — HISTORY OF PRESENT ILLNESS
[de-identified] : Patient is a 58 F referred for surgical clearance.  Patient went for pre-surgical testing for pin removal with Orthopedic Sx. Labs notable for WBC 2.80K, no differential. No other cytopenias noted. Denies fever, weight loss, night sweats, chills, fatigue, recurrent infections/hospitalizations. No new medications, travel.   Interval History: Saw her PCP and noted to have neutropenia again, as well hypokalemia. Patient concerned for possible malignancy. Had Orthopedic procedure without complications. No other complaints.

## 2024-09-10 NOTE — ASSESSMENT
[FreeTextEntry1] : Patient is a 58 F referred for surgical clearance.  Patient went for pre-surgical testing for pin removal with Orthopedic Sx. Labs notable for WBC 2.80K, no differential. No other cytopenias noted. Denies fever, weight loss, night sweats, chills, fatigue, recurrent infections/hospitalizations. No new medications, travel.   #Leukopenia - WBC today 2.4K with ANC 1000 - DDx idiopathic, Beltran Null - No constitutional symptoms, unremarkable exam - CMP, LDH, iron studies, B12/folate, copper, Flow cytometry unremarkable  F/u 3 months

## 2024-09-11 LAB
ALBUMIN SERPL ELPH-MCNC: 4.4 G/DL
ALP BLD-CCNC: 80 U/L
ALT SERPL-CCNC: 24 U/L
ANION GAP SERPL CALC-SCNC: 12 MMOL/L
AST SERPL-CCNC: 24 U/L
BILIRUB SERPL-MCNC: 0.9 MG/DL
BUN SERPL-MCNC: 10 MG/DL
CALCIUM SERPL-MCNC: 9.9 MG/DL
CHLORIDE SERPL-SCNC: 106 MMOL/L
CO2 SERPL-SCNC: 28 MMOL/L
CREAT SERPL-MCNC: 0.95 MG/DL
EGFR: 70 ML/MIN/1.73M2
GLUCOSE SERPL-MCNC: 86 MG/DL
POTASSIUM SERPL-SCNC: 3.7 MMOL/L
PROT SERPL-MCNC: 7 G/DL
SODIUM SERPL-SCNC: 146 MMOL/L

## 2024-09-12 LAB
ANISOCYTOSIS BLD QL: SLIGHT — SIGNIFICANT CHANGE UP
BASOPHILS # BLD AUTO: 0.1 K/UL — SIGNIFICANT CHANGE UP (ref 0–0.2)
BASOPHILS NFR BLD AUTO: 3 % — HIGH (ref 0–2)
ELLIPTOCYTES BLD QL SMEAR: SLIGHT — SIGNIFICANT CHANGE UP
EOSINOPHIL # BLD AUTO: 0.1 K/UL — SIGNIFICANT CHANGE UP (ref 0–0.5)
EOSINOPHIL NFR BLD AUTO: 3 % — SIGNIFICANT CHANGE UP (ref 0–6)
GIANT PLATELETS BLD QL SMEAR: PRESENT — SIGNIFICANT CHANGE UP
HCT VFR BLD CALC: 37.4 % — SIGNIFICANT CHANGE UP (ref 34.5–45)
HGB BLD-MCNC: 11.5 G/DL — SIGNIFICANT CHANGE UP (ref 11.5–15.5)
HYPOCHROMIA BLD QL: SLIGHT — SIGNIFICANT CHANGE UP
LYMPHOCYTES # BLD AUTO: 1 K/UL — SIGNIFICANT CHANGE UP (ref 1–3.3)
LYMPHOCYTES # BLD AUTO: 53 % — HIGH (ref 13–44)
MACROCYTES BLD QL: SLIGHT — SIGNIFICANT CHANGE UP
MCHC RBC-ENTMCNC: 26.4 PG — LOW (ref 27–34)
MCHC RBC-ENTMCNC: 30.8 G/DL — LOW (ref 32–36)
MCV RBC AUTO: 85.7 FL — SIGNIFICANT CHANGE UP (ref 80–100)
MICROCYTES BLD QL: SLIGHT — SIGNIFICANT CHANGE UP
MONOCYTES # BLD AUTO: 0.3 K/UL — SIGNIFICANT CHANGE UP (ref 0–0.9)
MONOCYTES NFR BLD AUTO: 10 % — SIGNIFICANT CHANGE UP (ref 2–14)
NEUTROPHILS # BLD AUTO: 1 K/UL — LOW (ref 1.8–7.4)
NEUTROPHILS NFR BLD AUTO: 31 % — LOW (ref 43–77)
OVALOCYTES BLD QL SMEAR: SLIGHT — SIGNIFICANT CHANGE UP
PLAT MORPH BLD: NORMAL — SIGNIFICANT CHANGE UP
PLATELET # BLD AUTO: 365 K/UL — SIGNIFICANT CHANGE UP (ref 150–400)
POIKILOCYTOSIS BLD QL AUTO: SIGNIFICANT CHANGE UP
RBC # BLD: 4.36 M/UL — SIGNIFICANT CHANGE UP (ref 3.8–5.2)
RBC # FLD: 14.5 % — SIGNIFICANT CHANGE UP (ref 10.3–14.5)
RBC BLD AUTO: ABNORMAL
SCHISTOCYTES BLD QL AUTO: SLIGHT — SIGNIFICANT CHANGE UP
WBC # BLD: 2.4 K/UL — LOW (ref 3.8–10.5)
WBC # FLD AUTO: 2.4 K/UL — LOW (ref 3.8–10.5)

## 2024-10-15 ENCOUNTER — OFFICE (OUTPATIENT)
Dept: URBAN - METROPOLITAN AREA CLINIC 63 | Facility: CLINIC | Age: 59
Setting detail: OPHTHALMOLOGY
End: 2024-10-15
Payer: COMMERCIAL

## 2024-10-15 DIAGNOSIS — H40.013: ICD-10-CM

## 2024-10-15 DIAGNOSIS — H25.13: ICD-10-CM

## 2024-10-15 DIAGNOSIS — H16.223: ICD-10-CM

## 2024-10-15 DIAGNOSIS — H01.002: ICD-10-CM

## 2024-10-15 DIAGNOSIS — H01.005: ICD-10-CM

## 2024-10-15 DIAGNOSIS — D31.30: ICD-10-CM

## 2024-10-15 PROCEDURE — 92250 FUNDUS PHOTOGRAPHY W/I&R: CPT | Performed by: OPHTHALMOLOGY

## 2024-10-15 PROCEDURE — 92083 EXTENDED VISUAL FIELD XM: CPT | Performed by: OPHTHALMOLOGY

## 2024-10-15 PROCEDURE — 92014 COMPRE OPH EXAM EST PT 1/>: CPT | Performed by: OPHTHALMOLOGY

## 2024-10-15 ASSESSMENT — TONOMETRY
OS_IOP_MMHG: 18
OD_IOP_MMHG: 18

## 2024-10-15 ASSESSMENT — REFRACTION_AUTOREFRACTION
OD_AXIS: 108
OS_AXIS: 074
OD_CYLINDER: -0.75
OD_SPHERE: +1.25
OS_CYLINDER: -0.50
OS_SPHERE: +2.00

## 2024-10-15 ASSESSMENT — LID EXAM ASSESSMENTS
OD_BLEPHARITIS: RLL 1+
OS_BLEPHARITIS: LLL 1+

## 2024-10-15 ASSESSMENT — CONFRONTATIONAL VISUAL FIELD TEST (CVF)
OD_FINDINGS: FULL
OS_FINDINGS: FULL

## 2024-10-15 ASSESSMENT — VISUAL ACUITY
OS_BCVA: 20/25
OD_BCVA: 20/30

## 2024-10-15 ASSESSMENT — SUPERFICIAL PUNCTATE KERATITIS (SPK)
OD_SPK: T
OS_SPK: T

## 2024-12-09 ENCOUNTER — APPOINTMENT (OUTPATIENT)
Dept: ORTHOPEDIC SURGERY | Facility: HOSPITAL | Age: 59
End: 2024-12-09

## 2024-12-17 ENCOUNTER — APPOINTMENT (OUTPATIENT)
Dept: HEMATOLOGY ONCOLOGY | Facility: CLINIC | Age: 59
End: 2024-12-17

## 2024-12-27 ENCOUNTER — APPOINTMENT (OUTPATIENT)
Dept: ORTHOPEDIC SURGERY | Facility: CLINIC | Age: 59
End: 2024-12-27

## 2025-02-24 ENCOUNTER — APPOINTMENT (OUTPATIENT)
Dept: PAIN MANAGEMENT | Facility: CLINIC | Age: 60
End: 2025-02-24
Payer: COMMERCIAL

## 2025-02-24 VITALS — BODY MASS INDEX: 25.1 KG/M2 | WEIGHT: 147 LBS | HEIGHT: 64 IN

## 2025-02-24 DIAGNOSIS — M54.12 RADICULOPATHY, CERVICAL REGION: ICD-10-CM

## 2025-02-24 PROCEDURE — 99214 OFFICE O/P EST MOD 30 MIN: CPT

## 2025-03-27 ENCOUNTER — APPOINTMENT (OUTPATIENT)
Dept: ORTHOPEDIC SURGERY | Facility: AMBULATORY SURGERY CENTER | Age: 60
End: 2025-03-27

## 2025-03-27 PROCEDURE — 62321 NJX INTERLAMINAR CRV/THRC: CPT

## 2025-04-14 ENCOUNTER — OUTPATIENT (OUTPATIENT)
Dept: OUTPATIENT SERVICES | Facility: HOSPITAL | Age: 60
LOS: 1 days | Discharge: HOME | End: 2025-04-14

## 2025-04-14 DIAGNOSIS — Z98.890 OTHER SPECIFIED POSTPROCEDURAL STATES: Chronic | ICD-10-CM

## 2025-04-14 DIAGNOSIS — Z98.51 TUBAL LIGATION STATUS: Chronic | ICD-10-CM

## 2025-04-14 DIAGNOSIS — Z90.3 ACQUIRED ABSENCE OF STOMACH [PART OF]: Chronic | ICD-10-CM

## 2025-04-14 DIAGNOSIS — Z98.89 OTHER SPECIFIED POSTPROCEDURAL STATES: Chronic | ICD-10-CM

## 2025-04-14 DIAGNOSIS — Z98.84 BARIATRIC SURGERY STATUS: Chronic | ICD-10-CM

## 2025-04-14 DIAGNOSIS — Z90.710 ACQUIRED ABSENCE OF BOTH CERVIX AND UTERUS: Chronic | ICD-10-CM

## 2025-04-14 DIAGNOSIS — D64.9 ANEMIA, UNSPECIFIED: ICD-10-CM

## 2025-04-14 DIAGNOSIS — Z90.49 ACQUIRED ABSENCE OF OTHER SPECIFIED PARTS OF DIGESTIVE TRACT: Chronic | ICD-10-CM

## 2025-04-21 ENCOUNTER — APPOINTMENT (OUTPATIENT)
Dept: HEMATOLOGY ONCOLOGY | Facility: CLINIC | Age: 60
End: 2025-04-21
Payer: COMMERCIAL

## 2025-04-21 ENCOUNTER — TRANSCRIPTION ENCOUNTER (OUTPATIENT)
Age: 60
End: 2025-04-21

## 2025-04-21 ENCOUNTER — RESULT REVIEW (OUTPATIENT)
Age: 60
End: 2025-04-21

## 2025-04-21 VITALS
DIASTOLIC BLOOD PRESSURE: 80 MMHG | WEIGHT: 148.81 LBS | OXYGEN SATURATION: 99 % | BODY MASS INDEX: 25.41 KG/M2 | HEIGHT: 64 IN | HEART RATE: 70 BPM | SYSTOLIC BLOOD PRESSURE: 153 MMHG

## 2025-04-21 DIAGNOSIS — D72.819 DECREASED WHITE BLOOD CELL COUNT, UNSPECIFIED: ICD-10-CM

## 2025-04-21 LAB
BASOPHILS # BLD AUTO: 0.06 K/UL — SIGNIFICANT CHANGE UP (ref 0–0.2)
BASOPHILS NFR BLD AUTO: 1.6 % — SIGNIFICANT CHANGE UP (ref 0–2)
EOSINOPHIL # BLD AUTO: 0.12 K/UL — SIGNIFICANT CHANGE UP (ref 0–0.5)
EOSINOPHIL NFR BLD AUTO: 3.3 % — SIGNIFICANT CHANGE UP (ref 0–6)
HCT VFR BLD CALC: 37.1 % — SIGNIFICANT CHANGE UP (ref 34.5–45)
HGB BLD-MCNC: 11.6 G/DL — SIGNIFICANT CHANGE UP (ref 11.5–15.5)
IMM GRANULOCYTES # BLD AUTO: 0.01 K/UL — SIGNIFICANT CHANGE UP (ref 0–0.07)
IMM GRANULOCYTES NFR BLD AUTO: 0.3 % — SIGNIFICANT CHANGE UP (ref 0–0.9)
LYMPHOCYTES # BLD AUTO: 1.09 K/UL — SIGNIFICANT CHANGE UP (ref 1–3.3)
LYMPHOCYTES NFR BLD AUTO: 29.7 % — SIGNIFICANT CHANGE UP (ref 13–44)
MCHC RBC-ENTMCNC: 26.4 PG — LOW (ref 27–34)
MCHC RBC-ENTMCNC: 31.3 G/DL — LOW (ref 32–36)
MCV RBC AUTO: 84.3 FL — SIGNIFICANT CHANGE UP (ref 80–100)
MONOCYTES # BLD AUTO: 0.2 K/UL — SIGNIFICANT CHANGE UP (ref 0–0.9)
MONOCYTES NFR BLD AUTO: 5.4 % — SIGNIFICANT CHANGE UP (ref 2–14)
NEUTROPHILS # BLD AUTO: 2.19 K/UL — SIGNIFICANT CHANGE UP (ref 1.8–7.4)
NEUTROPHILS NFR BLD AUTO: 59.7 % — SIGNIFICANT CHANGE UP (ref 43–77)
NRBC # BLD AUTO: 0 K/UL — SIGNIFICANT CHANGE UP (ref 0–0)
NRBC # FLD: 0 K/UL — SIGNIFICANT CHANGE UP (ref 0–0)
NRBC BLD AUTO-RTO: 0 /100 WBCS — SIGNIFICANT CHANGE UP (ref 0–0)
PLATELET # BLD AUTO: 260 K/UL — SIGNIFICANT CHANGE UP (ref 150–400)
PMV BLD: 8.7 FL — SIGNIFICANT CHANGE UP (ref 7–13)
RBC # BLD: 4.4 M/UL — SIGNIFICANT CHANGE UP (ref 3.8–5.2)
RBC # FLD: 14.3 % — SIGNIFICANT CHANGE UP (ref 10.3–14.5)
WBC # BLD: 3.67 K/UL — LOW (ref 3.8–10.5)
WBC # FLD AUTO: 3.67 K/UL — LOW (ref 3.8–10.5)

## 2025-04-21 PROCEDURE — 99214 OFFICE O/P EST MOD 30 MIN: CPT

## 2025-04-22 LAB
25(OH)D3 SERPL-MCNC: 38.2 NG/ML
ALBUMIN SERPL ELPH-MCNC: 4.1 G/DL
ALP BLD-CCNC: 108 U/L
ALT SERPL-CCNC: 25 U/L
ANION GAP SERPL CALC-SCNC: 12 MMOL/L
APTT BLD: 31.7 SEC
AST SERPL-CCNC: 38 U/L
BILIRUB SERPL-MCNC: 0.9 MG/DL
BUN SERPL-MCNC: 13 MG/DL
CALCIUM SERPL-MCNC: 9.5 MG/DL
CHLORIDE SERPL-SCNC: 109 MMOL/L
CO2 SERPL-SCNC: 25 MMOL/L
CREAT SERPL-MCNC: 0.93 MG/DL
EGFRCR SERPLBLD CKD-EPI 2021: 71 ML/MIN/1.73M2
ESTIMATED AVERAGE GLUCOSE: 94 MG/DL
FOLATE SERPL-MCNC: 6.2 NG/ML
GLUCOSE SERPL-MCNC: 96 MG/DL
HBA1C MFR BLD HPLC: 4.9 %
INR PPP: 0.95 RATIO
MAGNESIUM SERPL-MCNC: 2.1 MG/DL
POTASSIUM SERPL-SCNC: 4 MMOL/L
PREALB SERPL NEPH-MCNC: 25 MG/DL
PROT SERPL-MCNC: 6.6 G/DL
PT BLD: 11.3 SEC
SODIUM SERPL-SCNC: 146 MMOL/L
T3FREE SERPL-MCNC: 2.57 PG/ML
T4 FREE SERPL-MCNC: 1.1 NG/DL
TSH SERPL-ACNC: 0.91 UIU/ML
VIT B12 SERPL-MCNC: 327 PG/ML

## 2025-04-23 ENCOUNTER — APPOINTMENT (OUTPATIENT)
Dept: PAIN MANAGEMENT | Facility: CLINIC | Age: 60
End: 2025-04-23

## 2025-04-24 LAB
COTINE: <2 NG/ML
COTININE SERPL-MCNC: <2 NG/ML

## 2025-04-30 LAB — VIT B1 SERPL-MCNC: 129.5 NMOL/L

## 2025-05-05 ENCOUNTER — APPOINTMENT (OUTPATIENT)
Dept: PAIN MANAGEMENT | Facility: CLINIC | Age: 60
End: 2025-05-05
Payer: COMMERCIAL

## 2025-05-05 VITALS — BODY MASS INDEX: 25.61 KG/M2 | HEIGHT: 64 IN | WEIGHT: 150 LBS

## 2025-05-05 DIAGNOSIS — M54.12 RADICULOPATHY, CERVICAL REGION: ICD-10-CM

## 2025-05-05 PROCEDURE — 99214 OFFICE O/P EST MOD 30 MIN: CPT

## 2025-06-11 ENCOUNTER — APPOINTMENT (OUTPATIENT)
Dept: PAIN MANAGEMENT | Facility: CLINIC | Age: 60
End: 2025-06-11

## 2025-06-18 ENCOUNTER — APPOINTMENT (OUTPATIENT)
Dept: PAIN MANAGEMENT | Facility: CLINIC | Age: 60
End: 2025-06-18
Payer: COMMERCIAL

## 2025-06-18 PROCEDURE — 62321 NJX INTERLAMINAR CRV/THRC: CPT

## 2025-07-14 ENCOUNTER — APPOINTMENT (OUTPATIENT)
Dept: PAIN MANAGEMENT | Facility: CLINIC | Age: 60
End: 2025-07-14
Payer: COMMERCIAL

## 2025-07-14 VITALS — WEIGHT: 151 LBS | BODY MASS INDEX: 25.78 KG/M2 | HEIGHT: 64 IN

## 2025-07-14 PROCEDURE — 99214 OFFICE O/P EST MOD 30 MIN: CPT

## 2025-09-15 ENCOUNTER — NON-APPOINTMENT (OUTPATIENT)
Age: 60
End: 2025-09-15

## 2025-09-15 ENCOUNTER — APPOINTMENT (OUTPATIENT)
Dept: BARIATRICS/WEIGHT MGMT | Facility: CLINIC | Age: 60
End: 2025-09-15
Payer: COMMERCIAL

## 2025-09-15 VITALS
HEART RATE: 64 BPM | DIASTOLIC BLOOD PRESSURE: 82 MMHG | SYSTOLIC BLOOD PRESSURE: 122 MMHG | BODY MASS INDEX: 25.44 KG/M2 | OXYGEN SATURATION: 99 % | WEIGHT: 149 LBS | HEIGHT: 64 IN | TEMPERATURE: 97 F

## 2025-09-15 DIAGNOSIS — E66.813 OBESITY, CLASS 3: ICD-10-CM

## 2025-09-15 DIAGNOSIS — I10 ESSENTIAL (PRIMARY) HYPERTENSION: ICD-10-CM

## 2025-09-15 DIAGNOSIS — E66.3 OVERWEIGHT: ICD-10-CM

## 2025-09-15 DIAGNOSIS — G47.33 OBSTRUCTIVE SLEEP APNEA (ADULT) (PEDIATRIC): ICD-10-CM

## 2025-09-15 PROCEDURE — 99215 OFFICE O/P EST HI 40 MIN: CPT

## 2025-09-15 RX ORDER — SEMAGLUTIDE 0.25 MG/.5ML
0.25 INJECTION, SOLUTION SUBCUTANEOUS
Qty: 1 | Refills: 0 | Status: ACTIVE | COMMUNITY
Start: 2025-09-15 | End: 1900-01-01

## (undated) DEVICE — GLV 7 PROTEXIS (WHITE)

## (undated) DEVICE — TUBING DEPUY MITEK FMS INFLOW

## (undated) DEVICE — SHOE  POSTOP SOFTIE DARCO M-SM

## (undated) DEVICE — WARMING BLANKET UPPER ADULT

## (undated) DEVICE — GLV 6 PROTEXIS (WHITE)

## (undated) DEVICE — NDL HYPO REGULAR BEVEL 25G X 1.5" (BLUE)

## (undated) DEVICE — SOL IRR POUR NS 0.9% 500ML

## (undated) DEVICE — ELCTR GROUNDING PAD ADULT COVIDIEN

## (undated) DEVICE — GOWN LG W TOWEL

## (undated) DEVICE — GLV 7.5 PROTEXIS (WHITE)

## (undated) DEVICE — TUBING DEPUY MITEK FMS OUTFLOW

## (undated) DEVICE — GLV 6.5 PROTEXIS (WHITE)

## (undated) DEVICE — DRAPE INSTRUMENT POUCH 6.75" X 11"

## (undated) DEVICE — SUT POLYSORB 4-0 30" C-13 UNDYED

## (undated) DEVICE — MARKING PEN WRITESITE PLUS

## (undated) DEVICE — BUR S&N STONECUTTER WIDE MOUTH 4MM

## (undated) DEVICE — POSITIONER FOAM HEAD CRADLE (PINK)

## (undated) DEVICE — ELCTR VAPR S90 SUCTION W/INTEGRATED HNDPIECE 4.0MM 90DEG

## (undated) DEVICE — ELCTR BOVIE TIP BLADE INSULATED 2.75" EDGE

## (undated) DEVICE — GOWN TRIMAX XXL

## (undated) DEVICE — SAW BLADE CONMED LINVATEC RECIPROCATING CROSS CUT SMALL 5.5 X 9.5MM

## (undated) DEVICE — PACK LOWER EXTREMITY

## (undated) DEVICE — TOURNIQUET CUFF 18" DUAL PORT SINGLE BLADDER W PLC  (BLACK)

## (undated) DEVICE — DRAPE TOWEL BLUE 17" X 24"

## (undated) DEVICE — SUT POLYSORB 3-0 18" C-13 UNDYED

## (undated) DEVICE — SYR LUER LOK 10CC

## (undated) DEVICE — SPONGE RAYTEC 4X4 16PLY

## (undated) DEVICE — DRSG WEBRIL 4"

## (undated) DEVICE — PREP CHLORAPREP HI-LITE ORANGE 26ML

## (undated) DEVICE — DRILL BIT SYNTHES ORTHO QC 1.5X85MM

## (undated) DEVICE — BUR LINVATEC VORTEX 4.5 GREEN

## (undated) DEVICE — TAPE SILK 3"

## (undated) DEVICE — BLADE SCALPEL SAFETYLOCK #15

## (undated) DEVICE — DRSG STOCKINETTE TUBULAR COTTON 3" NS

## (undated) DEVICE — DRAPE MAYO STAND 30"

## (undated) DEVICE — GLV 7.5 PROTEXIS (BLUE)

## (undated) DEVICE — DRSG STOCKINETTE IMPERVIOUS XL

## (undated) DEVICE — SUT POLYSORB 2-0 30" V-20 UNDYED

## (undated) DEVICE — SYR LUER LOK 50CC

## (undated) DEVICE — SHOE  POSTOP SOFTIE DARCO W-LG

## (undated) DEVICE — PREP BETADINE KIT

## (undated) DEVICE — DRAPE C ARM C-ARMOUR

## (undated) DEVICE — SAW BLADE MICROAIRE SAGITTAL 9.4MMX25.4MMX0.6MM

## (undated) DEVICE — CANNULA LINVATEC NON-FENESTRATED 8.4MM 75MM

## (undated) DEVICE — DRSG ADAPTIC 3 X 8"

## (undated) DEVICE — DRILL BIT SYNTHES ORTHO QC 2.7X100MM

## (undated) DEVICE — PACK SHOULDER ARTHROSCOPY

## (undated) DEVICE — SUT PROLENE 0 30" CT-2

## (undated) DEVICE — CANISTER SUCTION LID GUARD 3000CC

## (undated) DEVICE — DRSG COMBINE 5X9"

## (undated) DEVICE — DRSG STERISTRIPS 0.5X4"

## (undated) DEVICE — VENODYNE/SCD SLEEVE CALF MEDIUM

## (undated) DEVICE — GLV 7.5 PROTEXIS

## (undated) DEVICE — SOLIDIFIER CANN EXPRESS 3K

## (undated) DEVICE — SUT MONOSOF 5-0 18" C-13

## (undated) DEVICE — SOL IRR BAG NS 0.9% 3000ML

## (undated) DEVICE — SHOE  POSTOP SOFTIE DARCO M-LG

## (undated) DEVICE — DRILL BIT SYNTHES ORTHO QC 2X100MM

## (undated) DEVICE — SUT MONOSOF 4-0 18" C-13

## (undated) DEVICE — SUT MONOCRYL 3-0 27" PS-2 UNDYED

## (undated) DEVICE — BUR OVAL 4.0

## (undated) DEVICE — SHAVER BLADE LINVATEC ULTRAFRR 4.2MM

## (undated) DEVICE — DRAPE 3/4 SHEET W REINFORCEMENT 56X77"

## (undated) DEVICE — NDL HYPO SAFE 22G X 1.5"

## (undated) DEVICE — DRAPE SPLIT SHEETS 77X120"

## (undated) DEVICE — SHOE  POSTOP SOFTIE DARCO M-M

## (undated) DEVICE — NDL SPINAL 18G X 3.5"

## (undated) DEVICE — WRAP COMPRESSION CALF MED

## (undated) DEVICE — DRAPE C ARM UNIVERSAL

## (undated) DEVICE — PREP CHLORAPREP ORANGE 2PCT 26ML

## (undated) DEVICE — STRYKER INTERPULSE HANDPIECE W IRR SUCTION TUBE

## (undated) DEVICE — POSITIONER S&N MASK FACE SPIDER 2

## (undated) DEVICE — DRSG MCCONNELL ARM WRAP LG

## (undated) DEVICE — GLV 8 ESTEEM BLUE

## (undated) DEVICE — SUT PDS II 1 36" CT-1

## (undated) DEVICE — DRAPE MAYO STAND 23"

## (undated) DEVICE — DRSG TAPE MEDIPORE 4"

## (undated) DEVICE — BLANKET WARMER LOWER ADULT

## (undated) DEVICE — CANNULA LINVATEC SHOULDER 7CM GREY & ORANGE

## (undated) DEVICE — MARKER SKIN MULTI TIP 6"

## (undated) DEVICE — DRSG COBAN 4"

## (undated) DEVICE — ARTHREX MULTIFIRE SCORPION NEEDLE

## (undated) DEVICE — DRSG 4X4